# Patient Record
Sex: FEMALE | Race: WHITE | NOT HISPANIC OR LATINO | Employment: UNEMPLOYED | URBAN - METROPOLITAN AREA
[De-identification: names, ages, dates, MRNs, and addresses within clinical notes are randomized per-mention and may not be internally consistent; named-entity substitution may affect disease eponyms.]

---

## 2020-09-11 ENCOUNTER — OFFICE VISIT (OUTPATIENT)
Dept: URGENT CARE | Facility: CLINIC | Age: 25
End: 2020-09-11
Payer: COMMERCIAL

## 2020-09-11 VITALS
SYSTOLIC BLOOD PRESSURE: 119 MMHG | DIASTOLIC BLOOD PRESSURE: 75 MMHG | HEIGHT: 64 IN | HEART RATE: 77 BPM | WEIGHT: 130 LBS | BODY MASS INDEX: 22.2 KG/M2 | OXYGEN SATURATION: 99 % | TEMPERATURE: 98 F

## 2020-09-11 DIAGNOSIS — S80.212A ABRASION OF LEFT KNEE, INITIAL ENCOUNTER: Primary | ICD-10-CM

## 2020-09-11 PROCEDURE — 99202 OFFICE O/P NEW SF 15 MIN: CPT | Mod: S$GLB,,, | Performed by: INTERNAL MEDICINE

## 2020-09-11 PROCEDURE — 99202 PR OFFICE/OUTPT VISIT, NEW, LEVL II, 15-29 MIN: ICD-10-PCS | Mod: S$GLB,,, | Performed by: INTERNAL MEDICINE
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
## 2020-09-11 NOTE — PATIENT INSTRUCTIONS
Wound Care  Taking proper care of your wound will help it heal. Your healthcare provider may show you how to clean and dress the wound. He or she will also explain how to tell if the wound is healing normally. If you are unsure of how to take care of the wound, be sure to clarify what dressing to use and how often you should change the bandages. Here are the basic steps.     A wound that's not healing normally may be dark in color or have white streaks.   Wash your hands  Tips for washing your hands include:  · Use liquid soap and lather for 2 minutes. Scrub between your fingers and under your nails.  · Rinse with warm water, keeping your fingers pointing down.  · Use a paper towel to dry your hands and to turn off the faucet.  Remove the used dressing  Here are suggestions for removing the dressing:  · If dressing changes cause you pain, be sure to take your pain medicine as prescribed by your healthcare provider 30 minutes before dressing changes.  · Set up your supplies.  · Put on disposable gloves if youre dressing a wound for someone else or your wound is infected.  · Loosen the tape by pulling gently toward the wound.  · Gently take off the old dressing. If the dressing is stuck to the wound, moisten it with saline (if available) or clean water.  · If you have a drain or tube in the wound, be careful not to pull on it.  · Remove the dressing 1 layer at a time and put it in a plastic bag.  · Remove your gloves.  Inspect and dress the wound  Check the wound carefully:  · Each time you change the dressing, inspect the wound carefully to be sure its healing normally by making sure your wound appears to be pink and moist, and is free of infection.    · Wash your hands again. Put on a new pair of gloves.  · Clean and dress the wound as directed by your healthcare provider or nurse. Do not put anything in the wound that is not prescribed or directed by your healthcare provider. If you have a drain or tube, be  careful not to pull on it. Make sure to secure the drain or tube as well.  · Put all supplies in a plastic bag. Seal the bag and put it in the trash.  · Be sure to wash your hands again.  Call your healthcare provider  Call your healthcare provider if you see any of the following signs of a problem:  · Bleeding that soaks the dressing  · Pink fluid weeping from the wound  · Increased drainage or drainage that is yellow, yellow-green, or foul-smelling  · Increased swelling or pain, or redness or swelling in the skin around the wound  · A change in the color of the wound, or if streaks develop in a direction away from the wound  · The area between any stitches opens up  · An increase in the size of the wound  · A fever of 100.4°F (38°C) or higher, or as directed by your healthcare provider  · Chills, increased fatigue, or a loss of appetite      Date Last Reviewed: 7/30/2015  © 7005-3038 [employer] [address] [place] [address], [place] [address]. All rights reserved. This information is not intended as a substitute for professional medical care. Always follow your healthcare professional's instructions.

## 2020-09-14 ENCOUNTER — OFFICE VISIT (OUTPATIENT)
Dept: URGENT CARE | Facility: CLINIC | Age: 25
End: 2020-09-14
Payer: COMMERCIAL

## 2020-09-14 VITALS
OXYGEN SATURATION: 99 % | DIASTOLIC BLOOD PRESSURE: 71 MMHG | TEMPERATURE: 98 F | BODY MASS INDEX: 22.2 KG/M2 | WEIGHT: 130 LBS | HEART RATE: 99 BPM | HEIGHT: 64 IN | SYSTOLIC BLOOD PRESSURE: 114 MMHG

## 2020-09-14 DIAGNOSIS — S80.212D ABRASION OF LEFT KNEE, SUBSEQUENT ENCOUNTER: Primary | ICD-10-CM

## 2020-09-14 PROCEDURE — 99499 UNLISTED E&M SERVICE: CPT | Mod: S$GLB,,, | Performed by: INTERNAL MEDICINE

## 2020-09-14 PROCEDURE — 99499 NO LOS: ICD-10-PCS | Mod: S$GLB,,, | Performed by: INTERNAL MEDICINE

## 2020-09-14 NOTE — PROGRESS NOTES
"Subjective:       Patient ID: Lexis Candelario is a 24 y.o. female.    Vitals:  height is 5' 4" (1.626 m) and weight is 59 kg (130 lb). Her temperature is 97.8 °F (36.6 °C). Her blood pressure is 114/71 and her pulse is 99. Her oxygen saturation is 99%.     Chief Complaint: No chief complaint on file.    Patient wants a wound check for the wound on her left knee     Wound Check  She was originally treated 3 to 5 days ago. There has been no drainage from the wound. There is no redness present. There is no swelling present. There is no pain present. She has no difficulty moving the affected extremity or digit.       Constitution: Negative for chills, fatigue and fever.   HENT: Negative for congestion and sore throat.    Neck: Negative for painful lymph nodes.   Cardiovascular: Negative for chest pain and leg swelling.   Eyes: Negative for double vision and blurred vision.   Respiratory: Negative for cough and shortness of breath.    Gastrointestinal: Negative for nausea, vomiting and diarrhea.   Genitourinary: Negative for dysuria, frequency, urgency and history of kidney stones.   Musculoskeletal: Negative for joint pain, joint swelling, muscle cramps and muscle ache.   Skin: Positive for wound. Negative for color change, pale, rash and bruising.   Allergic/Immunologic: Negative for seasonal allergies.   Neurological: Negative for dizziness, history of vertigo, light-headedness, passing out and headaches.   Hematologic/Lymphatic: Negative for swollen lymph nodes.   Psychiatric/Behavioral: Negative for nervous/anxious, sleep disturbance and depression. The patient is not nervous/anxious.        Objective:      Physical Exam    Healthy granulation tissue seen. No surrounding erythema or induration. No discharge.  Well healing wound.   Assessment:       1. Abrasion of left knee, subsequent encounter        Plan:         Abrasion of left knee, subsequent encounter    Wound cleaned and mupirocin applied. Bandaged. Well " healing.

## 2021-02-06 ENCOUNTER — OFFICE VISIT (OUTPATIENT)
Dept: URGENT CARE | Facility: CLINIC | Age: 26
End: 2021-02-06
Payer: COMMERCIAL

## 2021-02-06 VITALS
BODY MASS INDEX: 22.2 KG/M2 | TEMPERATURE: 99 F | SYSTOLIC BLOOD PRESSURE: 111 MMHG | WEIGHT: 130 LBS | OXYGEN SATURATION: 97 % | HEART RATE: 100 BPM | HEIGHT: 64 IN | DIASTOLIC BLOOD PRESSURE: 72 MMHG

## 2021-02-06 DIAGNOSIS — U07.1 COVID-19 VIRUS DETECTED: ICD-10-CM

## 2021-02-06 DIAGNOSIS — Z11.59 SCREENING FOR VIRAL DISEASE: Primary | ICD-10-CM

## 2021-02-06 LAB
CTP QC/QA: YES
SARS-COV-2 RDRP RESP QL NAA+PROBE: POSITIVE

## 2021-02-06 PROCEDURE — U0002 COVID-19 LAB TEST NON-CDC: HCPCS | Mod: QW,S$GLB,, | Performed by: FAMILY MEDICINE

## 2021-02-06 PROCEDURE — 3008F PR BODY MASS INDEX (BMI) DOCUMENTED: ICD-10-PCS | Mod: CPTII,S$GLB,, | Performed by: FAMILY MEDICINE

## 2021-02-06 PROCEDURE — 99212 PR OFFICE/OUTPT VISIT, EST, LEVL II, 10-19 MIN: ICD-10-PCS | Mod: S$GLB,,, | Performed by: FAMILY MEDICINE

## 2021-02-06 PROCEDURE — U0002: ICD-10-PCS | Mod: QW,S$GLB,, | Performed by: FAMILY MEDICINE

## 2021-02-06 PROCEDURE — 99212 OFFICE O/P EST SF 10 MIN: CPT | Mod: S$GLB,,, | Performed by: FAMILY MEDICINE

## 2021-02-06 PROCEDURE — 3008F BODY MASS INDEX DOCD: CPT | Mod: CPTII,S$GLB,, | Performed by: FAMILY MEDICINE

## 2021-02-08 ENCOUNTER — NURSE TRIAGE (OUTPATIENT)
Dept: ADMINISTRATIVE | Facility: CLINIC | Age: 26
End: 2021-02-08

## 2021-03-09 ENCOUNTER — OFFICE VISIT (OUTPATIENT)
Dept: OBSTETRICS AND GYNECOLOGY | Facility: CLINIC | Age: 26
End: 2021-03-09
Payer: COMMERCIAL

## 2021-03-09 ENCOUNTER — LAB VISIT (OUTPATIENT)
Dept: LAB | Facility: HOSPITAL | Age: 26
End: 2021-03-09
Attending: OBSTETRICS & GYNECOLOGY
Payer: COMMERCIAL

## 2021-03-09 VITALS
WEIGHT: 141.75 LBS | HEIGHT: 64 IN | BODY MASS INDEX: 24.2 KG/M2 | DIASTOLIC BLOOD PRESSURE: 62 MMHG | SYSTOLIC BLOOD PRESSURE: 120 MMHG

## 2021-03-09 DIAGNOSIS — A53.0 POSITIVE SEROLOGY FOR SYPHILIS: ICD-10-CM

## 2021-03-09 DIAGNOSIS — N92.0 MENORRHAGIA WITH REGULAR CYCLE: ICD-10-CM

## 2021-03-09 DIAGNOSIS — Z01.419 VISIT FOR GYNECOLOGIC EXAMINATION: Primary | ICD-10-CM

## 2021-03-09 LAB
BASOPHILS # BLD AUTO: 0.09 K/UL (ref 0–0.2)
BASOPHILS NFR BLD: 1.2 % (ref 0–1.9)
DIFFERENTIAL METHOD: ABNORMAL
EOSINOPHIL # BLD AUTO: 0.1 K/UL (ref 0–0.5)
EOSINOPHIL NFR BLD: 1.3 % (ref 0–8)
ERYTHROCYTE [DISTWIDTH] IN BLOOD BY AUTOMATED COUNT: 11.9 % (ref 11.5–14.5)
HCT VFR BLD AUTO: 39.6 % (ref 37–48.5)
HGB BLD-MCNC: 13.3 G/DL (ref 12–16)
IMM GRANULOCYTES # BLD AUTO: 0.14 K/UL (ref 0–0.04)
IMM GRANULOCYTES NFR BLD AUTO: 1.9 % (ref 0–0.5)
LYMPHOCYTES # BLD AUTO: 2.3 K/UL (ref 1–4.8)
LYMPHOCYTES NFR BLD: 30.9 % (ref 18–48)
MCH RBC QN AUTO: 29.8 PG (ref 27–31)
MCHC RBC AUTO-ENTMCNC: 33.6 G/DL (ref 32–36)
MCV RBC AUTO: 89 FL (ref 82–98)
MONOCYTES # BLD AUTO: 0.7 K/UL (ref 0.3–1)
MONOCYTES NFR BLD: 9.4 % (ref 4–15)
NEUTROPHILS # BLD AUTO: 4.1 K/UL (ref 1.8–7.7)
NEUTROPHILS NFR BLD: 55.3 % (ref 38–73)
NRBC BLD-RTO: 0 /100 WBC
PLATELET # BLD AUTO: 261 K/UL (ref 150–350)
PMV BLD AUTO: 8.7 FL (ref 9.2–12.9)
RBC # BLD AUTO: 4.46 M/UL (ref 4–5.4)
TSH SERPL DL<=0.005 MIU/L-ACNC: 1.94 UIU/ML (ref 0.4–4)
WBC # BLD AUTO: 7.47 K/UL (ref 3.9–12.7)

## 2021-03-09 PROCEDURE — 84443 ASSAY THYROID STIM HORMONE: CPT | Performed by: OBSTETRICS & GYNECOLOGY

## 2021-03-09 PROCEDURE — 99999 PR PBB SHADOW E&M-EST. PATIENT-LVL II: ICD-10-PCS | Mod: PBBFAC,,, | Performed by: OBSTETRICS & GYNECOLOGY

## 2021-03-09 PROCEDURE — 99385 PREV VISIT NEW AGE 18-39: CPT | Mod: S$GLB,,, | Performed by: OBSTETRICS & GYNECOLOGY

## 2021-03-09 PROCEDURE — 3008F PR BODY MASS INDEX (BMI) DOCUMENTED: ICD-10-PCS | Mod: CPTII,S$GLB,, | Performed by: OBSTETRICS & GYNECOLOGY

## 2021-03-09 PROCEDURE — 99385 PR PREVENTIVE VISIT,NEW,18-39: ICD-10-PCS | Mod: S$GLB,,, | Performed by: OBSTETRICS & GYNECOLOGY

## 2021-03-09 PROCEDURE — 36415 COLL VENOUS BLD VENIPUNCTURE: CPT | Performed by: OBSTETRICS & GYNECOLOGY

## 2021-03-09 PROCEDURE — 86592 SYPHILIS TEST NON-TREP QUAL: CPT | Performed by: OBSTETRICS & GYNECOLOGY

## 2021-03-09 PROCEDURE — 85025 COMPLETE CBC W/AUTO DIFF WBC: CPT | Performed by: OBSTETRICS & GYNECOLOGY

## 2021-03-09 PROCEDURE — 1126F AMNT PAIN NOTED NONE PRSNT: CPT | Mod: S$GLB,,, | Performed by: OBSTETRICS & GYNECOLOGY

## 2021-03-09 PROCEDURE — 1126F PR PAIN SEVERITY QUANTIFIED, NO PAIN PRESENT: ICD-10-PCS | Mod: S$GLB,,, | Performed by: OBSTETRICS & GYNECOLOGY

## 2021-03-09 PROCEDURE — 86780 TREPONEMA PALLIDUM: CPT | Performed by: OBSTETRICS & GYNECOLOGY

## 2021-03-09 PROCEDURE — 99999 PR PBB SHADOW E&M-EST. PATIENT-LVL II: CPT | Mod: PBBFAC,,, | Performed by: OBSTETRICS & GYNECOLOGY

## 2021-03-09 PROCEDURE — 3008F BODY MASS INDEX DOCD: CPT | Mod: CPTII,S$GLB,, | Performed by: OBSTETRICS & GYNECOLOGY

## 2021-03-10 LAB — RPR SER QL: NORMAL

## 2021-03-14 ENCOUNTER — PATIENT MESSAGE (OUTPATIENT)
Dept: OBSTETRICS AND GYNECOLOGY | Facility: CLINIC | Age: 26
End: 2021-03-14

## 2021-03-15 LAB — T PALLIDUM AB SER QL IF: NORMAL

## 2021-05-28 ENCOUNTER — OFFICE VISIT (OUTPATIENT)
Dept: URGENT CARE | Facility: CLINIC | Age: 26
End: 2021-05-28
Payer: COMMERCIAL

## 2021-05-28 VITALS
SYSTOLIC BLOOD PRESSURE: 119 MMHG | RESPIRATION RATE: 20 BRPM | DIASTOLIC BLOOD PRESSURE: 83 MMHG | BODY MASS INDEX: 22.2 KG/M2 | OXYGEN SATURATION: 99 % | HEART RATE: 67 BPM | HEIGHT: 64 IN | WEIGHT: 130 LBS | TEMPERATURE: 98 F

## 2021-05-28 DIAGNOSIS — M25.572 ACUTE LEFT ANKLE PAIN: ICD-10-CM

## 2021-05-28 DIAGNOSIS — S93.602A FOOT SPRAIN, LEFT, INITIAL ENCOUNTER: Primary | ICD-10-CM

## 2021-05-28 DIAGNOSIS — S93.402A SPRAIN OF LEFT ANKLE, UNSPECIFIED LIGAMENT, INITIAL ENCOUNTER: ICD-10-CM

## 2021-05-28 PROCEDURE — 99214 OFFICE O/P EST MOD 30 MIN: CPT | Mod: S$GLB,,, | Performed by: NURSE PRACTITIONER

## 2021-05-28 PROCEDURE — 73610 XR ANKLE COMPLETE 3 VIEW LEFT: ICD-10-PCS | Mod: LT,S$GLB,, | Performed by: RADIOLOGY

## 2021-05-28 PROCEDURE — 73630 XR FOOT COMPLETE 3 VIEW LEFT: ICD-10-PCS | Mod: LT,S$GLB,, | Performed by: RADIOLOGY

## 2021-05-28 PROCEDURE — 73610 X-RAY EXAM OF ANKLE: CPT | Mod: LT,S$GLB,, | Performed by: RADIOLOGY

## 2021-05-28 PROCEDURE — 73630 X-RAY EXAM OF FOOT: CPT | Mod: LT,S$GLB,, | Performed by: RADIOLOGY

## 2021-05-28 PROCEDURE — 99214 PR OFFICE/OUTPT VISIT, EST, LEVL IV, 30-39 MIN: ICD-10-PCS | Mod: S$GLB,,, | Performed by: NURSE PRACTITIONER

## 2021-05-28 PROCEDURE — 3008F PR BODY MASS INDEX (BMI) DOCUMENTED: ICD-10-PCS | Mod: CPTII,S$GLB,, | Performed by: NURSE PRACTITIONER

## 2021-05-28 PROCEDURE — 3008F BODY MASS INDEX DOCD: CPT | Mod: CPTII,S$GLB,, | Performed by: NURSE PRACTITIONER

## 2021-05-28 RX ORDER — IBUPROFEN 600 MG/1
600 TABLET ORAL EVERY 6 HOURS PRN
Qty: 30 TABLET | Refills: 0 | Status: SHIPPED | OUTPATIENT
Start: 2021-05-28 | End: 2022-03-22

## 2021-09-04 ENCOUNTER — OFFICE VISIT (OUTPATIENT)
Dept: URGENT CARE | Facility: CLINIC | Age: 26
End: 2021-09-04
Payer: COMMERCIAL

## 2021-09-04 VITALS
TEMPERATURE: 98.4 F | OXYGEN SATURATION: 98 % | HEART RATE: 73 BPM | WEIGHT: 135 LBS | HEIGHT: 64 IN | BODY MASS INDEX: 23.05 KG/M2

## 2021-09-04 DIAGNOSIS — Z20.822 ENCOUNTER FOR SCREENING LABORATORY TESTING FOR COVID-19 VIRUS: Primary | ICD-10-CM

## 2021-09-04 DIAGNOSIS — J06.9 ACUTE URI: ICD-10-CM

## 2021-09-04 LAB — S PYO AG THROAT QL: NEGATIVE

## 2021-09-04 PROCEDURE — 87880 STREP A ASSAY W/OPTIC: CPT | Performed by: NURSE PRACTITIONER

## 2021-09-04 PROCEDURE — 87070 CULTURE OTHR SPECIMN AEROBIC: CPT | Performed by: NURSE PRACTITIONER

## 2021-09-04 PROCEDURE — 87635 SARS-COV-2 COVID-19 AMP PRB: CPT | Performed by: NURSE PRACTITIONER

## 2021-09-04 PROCEDURE — 99213 OFFICE O/P EST LOW 20 MIN: CPT | Performed by: NURSE PRACTITIONER

## 2021-09-04 NOTE — PROGRESS NOTES
[facility] [employer] [facility]        NAME: Nidia Moss is a 23 y o  female  : 1995    MRN: [id number]  DATE: 2021  TIME: 1:35 PM    Assessment and Plan   Encounter for screening laboratory testing for COVID-19 virus [Z20 [id number]]  1  Encounter for screening laboratory testing for COVID-19 virus  Novel Coronavirus (Covid-19),PCR [facility] Collection   2  Acute URI  POCT rapid strepA    Throat culture         Patient Instructions       Follow up with PCP in 3-5 days  Proceed to  ER if symptoms worsen  Chief Complaint     Chief Complaint   Patient presents with    Sore Throat     Sore throat, dry cough and nasal congestion x 4 days  Taking tylenol  No fever  History of Present Illness       Patient is a 23year old female presenting with 4 days of sore throat, congestion, clogged ears, chills, cough, PND, and rhinorrhea  Denies fever, N/V/D  She is using Sudafed, zyrtec, and tylenol  She is vaccinated for Covid  Denies known sick contacts  Review of Systems   Review of Systems   Constitutional: Positive for chills  Negative for activity change and fever  HENT: Positive for congestion, ear pain, postnasal drip, sinus pressure and sore throat  Respiratory: Positive for cough  Negative for shortness of breath  Current Medications     No current outpatient medications on file  Current Allergies     Allergies as of 2021 - Reviewed 2021   Allergen Reaction Noted    Other Hives 2021            The following portions of the patient's history were reviewed and updated as appropriate: allergies, current medications, past family history, past medical history, past social history, past surgical history and problem list      Past Medical History:   Diagnosis Date    COVID-19 2021       Past Surgical History:   Procedure Laterality Date    WISDOM TOOTH EXTRACTION         No family history on file  Medications have been verified          Objective Pulse 73   Temp 98 4 °F (36 9 °C)   Ht 5' 4" (1 626 m)   Wt 61 2 kg (135 lb)   SpO2 98%   BMI 23 17 kg/m²      Rapid strep: negative  Physical Exam     Physical Exam  Vitals reviewed  Constitutional:       General: She is awake  She is not in acute distress  Appearance: She is normal weight  HENT:      Head: Normocephalic  Right Ear: Hearing, tympanic membrane, ear canal and external ear normal       Left Ear: Hearing, tympanic membrane, ear canal and external ear normal       Nose: Congestion present  Mouth/Throat:      Lips: Pink  Cardiovascular:      Rate and Rhythm: Normal rate and regular rhythm  Heart sounds: Normal heart sounds, S1 normal and S2 normal    Pulmonary:      Effort: Pulmonary effort is normal       Breath sounds: Normal breath sounds  No decreased breath sounds, wheezing, rhonchi or rales  Skin:     General: Skin is warm and moist    Neurological:      General: No focal deficit present  Mental Status: She is alert, oriented to person, place, and time and easily aroused  Psychiatric:         Behavior: Behavior is cooperative

## 2021-09-06 LAB — SARS-COV-2 RNA RESP QL NAA+PROBE: NEGATIVE

## 2021-09-07 LAB — BACTERIA THROAT CULT: NORMAL

## 2021-12-03 ENCOUNTER — OFFICE VISIT (OUTPATIENT)
Dept: URGENT CARE | Facility: CLINIC | Age: 26
End: 2021-12-03
Payer: COMMERCIAL

## 2021-12-03 VITALS
TEMPERATURE: 99 F | RESPIRATION RATE: 18 BRPM | OXYGEN SATURATION: 98 % | WEIGHT: 135 LBS | HEIGHT: 64 IN | SYSTOLIC BLOOD PRESSURE: 121 MMHG | HEART RATE: 71 BPM | BODY MASS INDEX: 23.05 KG/M2 | DIASTOLIC BLOOD PRESSURE: 80 MMHG

## 2021-12-03 DIAGNOSIS — Z11.59 ENCOUNTER FOR SCREENING FOR OTHER VIRAL DISEASES: Primary | ICD-10-CM

## 2021-12-03 LAB
CTP QC/QA: YES
CTP QC/QA: YES
POC MOLECULAR INFLUENZA A AGN: NEGATIVE
POC MOLECULAR INFLUENZA B AGN: NEGATIVE
SARS-COV-2 RDRP RESP QL NAA+PROBE: NEGATIVE

## 2021-12-03 PROCEDURE — 87502 INFLUENZA DNA AMP PROBE: CPT | Mod: QW,S$GLB,, | Performed by: NURSE PRACTITIONER

## 2021-12-03 PROCEDURE — 99213 OFFICE O/P EST LOW 20 MIN: CPT | Mod: S$GLB,,, | Performed by: NURSE PRACTITIONER

## 2021-12-03 PROCEDURE — U0002: ICD-10-PCS | Mod: QW,S$GLB,, | Performed by: NURSE PRACTITIONER

## 2021-12-03 PROCEDURE — U0002 COVID-19 LAB TEST NON-CDC: HCPCS | Mod: QW,S$GLB,, | Performed by: NURSE PRACTITIONER

## 2021-12-03 PROCEDURE — 99213 PR OFFICE/OUTPT VISIT, EST, LEVL III, 20-29 MIN: ICD-10-PCS | Mod: S$GLB,,, | Performed by: NURSE PRACTITIONER

## 2021-12-03 PROCEDURE — 87502 POCT INFLUENZA A/B MOLECULAR: ICD-10-PCS | Mod: QW,S$GLB,, | Performed by: NURSE PRACTITIONER

## 2022-03-22 ENCOUNTER — LAB VISIT (OUTPATIENT)
Dept: LAB | Facility: HOSPITAL | Age: 27
End: 2022-03-22
Payer: COMMERCIAL

## 2022-03-22 ENCOUNTER — OFFICE VISIT (OUTPATIENT)
Dept: INTERNAL MEDICINE | Facility: CLINIC | Age: 27
End: 2022-03-22
Payer: COMMERCIAL

## 2022-03-22 ENCOUNTER — TELEPHONE (OUTPATIENT)
Dept: INTERNAL MEDICINE | Facility: CLINIC | Age: 27
End: 2022-03-22

## 2022-03-22 VITALS
HEART RATE: 80 BPM | WEIGHT: 137.38 LBS | DIASTOLIC BLOOD PRESSURE: 68 MMHG | BODY MASS INDEX: 23.45 KG/M2 | SYSTOLIC BLOOD PRESSURE: 118 MMHG | HEIGHT: 64 IN

## 2022-03-22 DIAGNOSIS — Z76.89 ESTABLISHING CARE WITH NEW DOCTOR, ENCOUNTER FOR: ICD-10-CM

## 2022-03-22 DIAGNOSIS — Z76.89 ESTABLISHING CARE WITH NEW DOCTOR, ENCOUNTER FOR: Primary | ICD-10-CM

## 2022-03-22 LAB
25(OH)D3+25(OH)D2 SERPL-MCNC: 24 NG/ML (ref 30–96)
ALBUMIN SERPL BCP-MCNC: 4.3 G/DL (ref 3.5–5.2)
ALP SERPL-CCNC: 85 U/L (ref 55–135)
ALT SERPL W/O P-5'-P-CCNC: 13 U/L (ref 10–44)
ANION GAP SERPL CALC-SCNC: 11 MMOL/L (ref 8–16)
AST SERPL-CCNC: 20 U/L (ref 10–40)
BASOPHILS # BLD AUTO: 0.09 K/UL (ref 0–0.2)
BASOPHILS NFR BLD: 1 % (ref 0–1.9)
BILIRUB SERPL-MCNC: 0.5 MG/DL (ref 0.1–1)
BUN SERPL-MCNC: 10 MG/DL (ref 6–20)
CALCIUM SERPL-MCNC: 9.8 MG/DL (ref 8.7–10.5)
CHLORIDE SERPL-SCNC: 106 MMOL/L (ref 95–110)
CO2 SERPL-SCNC: 23 MMOL/L (ref 23–29)
CREAT SERPL-MCNC: 0.8 MG/DL (ref 0.5–1.4)
DIFFERENTIAL METHOD: ABNORMAL
EOSINOPHIL # BLD AUTO: 0.5 K/UL (ref 0–0.5)
EOSINOPHIL NFR BLD: 5.8 % (ref 0–8)
ERYTHROCYTE [DISTWIDTH] IN BLOOD BY AUTOMATED COUNT: 11.8 % (ref 11.5–14.5)
EST. GFR  (AFRICAN AMERICAN): >60 ML/MIN/1.73 M^2
EST. GFR  (NON AFRICAN AMERICAN): >60 ML/MIN/1.73 M^2
GLUCOSE SERPL-MCNC: 110 MG/DL (ref 70–110)
HCT VFR BLD AUTO: 41.6 % (ref 37–48.5)
HGB BLD-MCNC: 13.7 G/DL (ref 12–16)
IMM GRANULOCYTES # BLD AUTO: 0.06 K/UL (ref 0–0.04)
IMM GRANULOCYTES NFR BLD AUTO: 0.7 % (ref 0–0.5)
LYMPHOCYTES # BLD AUTO: 2.5 K/UL (ref 1–4.8)
LYMPHOCYTES NFR BLD: 27 % (ref 18–48)
MCH RBC QN AUTO: 31 PG (ref 27–31)
MCHC RBC AUTO-ENTMCNC: 32.9 G/DL (ref 32–36)
MCV RBC AUTO: 94 FL (ref 82–98)
MONOCYTES # BLD AUTO: 0.7 K/UL (ref 0.3–1)
MONOCYTES NFR BLD: 7.5 % (ref 4–15)
NEUTROPHILS # BLD AUTO: 5.3 K/UL (ref 1.8–7.7)
NEUTROPHILS NFR BLD: 58 % (ref 38–73)
NRBC BLD-RTO: 0 /100 WBC
PLATELET # BLD AUTO: 285 K/UL (ref 150–450)
PMV BLD AUTO: 10.2 FL (ref 9.2–12.9)
POTASSIUM SERPL-SCNC: 3.6 MMOL/L (ref 3.5–5.1)
PROT SERPL-MCNC: 7.4 G/DL (ref 6–8.4)
RBC # BLD AUTO: 4.42 M/UL (ref 4–5.4)
SODIUM SERPL-SCNC: 140 MMOL/L (ref 136–145)
TSH SERPL DL<=0.005 MIU/L-ACNC: 1.59 UIU/ML (ref 0.4–4)
WBC # BLD AUTO: 9.1 K/UL (ref 3.9–12.7)

## 2022-03-22 PROCEDURE — 36415 COLL VENOUS BLD VENIPUNCTURE: CPT | Performed by: STUDENT IN AN ORGANIZED HEALTH CARE EDUCATION/TRAINING PROGRAM

## 2022-03-22 PROCEDURE — 85025 COMPLETE CBC W/AUTO DIFF WBC: CPT | Performed by: STUDENT IN AN ORGANIZED HEALTH CARE EDUCATION/TRAINING PROGRAM

## 2022-03-22 PROCEDURE — 80053 COMPREHEN METABOLIC PANEL: CPT | Performed by: STUDENT IN AN ORGANIZED HEALTH CARE EDUCATION/TRAINING PROGRAM

## 2022-03-22 PROCEDURE — 99999 PR PBB SHADOW E&M-EST. PATIENT-LVL III: ICD-10-PCS | Mod: PBBFAC,,, | Performed by: STUDENT IN AN ORGANIZED HEALTH CARE EDUCATION/TRAINING PROGRAM

## 2022-03-22 PROCEDURE — 84443 ASSAY THYROID STIM HORMONE: CPT | Performed by: STUDENT IN AN ORGANIZED HEALTH CARE EDUCATION/TRAINING PROGRAM

## 2022-03-22 PROCEDURE — 99395 PR PREVENTIVE VISIT,EST,18-39: ICD-10-PCS | Mod: S$GLB,,, | Performed by: STUDENT IN AN ORGANIZED HEALTH CARE EDUCATION/TRAINING PROGRAM

## 2022-03-22 PROCEDURE — 87389 HIV-1 AG W/HIV-1&-2 AB AG IA: CPT | Performed by: STUDENT IN AN ORGANIZED HEALTH CARE EDUCATION/TRAINING PROGRAM

## 2022-03-22 PROCEDURE — 86803 HEPATITIS C AB TEST: CPT | Performed by: STUDENT IN AN ORGANIZED HEALTH CARE EDUCATION/TRAINING PROGRAM

## 2022-03-22 PROCEDURE — 99395 PREV VISIT EST AGE 18-39: CPT | Mod: S$GLB,,, | Performed by: STUDENT IN AN ORGANIZED HEALTH CARE EDUCATION/TRAINING PROGRAM

## 2022-03-22 PROCEDURE — 99999 PR PBB SHADOW E&M-EST. PATIENT-LVL III: CPT | Mod: PBBFAC,,, | Performed by: STUDENT IN AN ORGANIZED HEALTH CARE EDUCATION/TRAINING PROGRAM

## 2022-03-22 PROCEDURE — 82306 VITAMIN D 25 HYDROXY: CPT | Performed by: STUDENT IN AN ORGANIZED HEALTH CARE EDUCATION/TRAINING PROGRAM

## 2022-03-22 NOTE — PROGRESS NOTES
Internal Medicine Clinic Note  2022    Subjective   Patient Name: Lexis Candelario  : 1995    Chief Complaint: Establish care    History of Present Illness:  Ms. Lexis Candelario is a 26 y.o. female with remote history of sinusitis who presents to [facility]. Here seeking a general wellness visit. One episode in  of dizziness, since resolved. Felt similar to sinusitis in past, but denies other URI symptoms. EtOH socially. Regular physical activity with running, yoga, pilates. Appt next week with ob-gyn. Denies fever, chills, syncope, CP, palpitations, SOB, abdominal pain, N/V/D/C, edema.    Review of Systems   Constitutional: Negative for chills and fever.   HENT: Negative for sore throat.    Respiratory: Negative for shortness of breath.    Cardiovascular: Negative for chest pain, palpitations and leg swelling.   Gastrointestinal: Negative for abdominal pain, constipation, diarrhea, nausea and vomiting.   Genitourinary: Negative for dysuria.   Musculoskeletal: Negative for falls.   Neurological: Negative for dizziness and loss of consciousness.   Psychiatric/Behavioral: Negative for substance abuse.       PAST HISTORY:     Past Medical History:   Diagnosis Date    Sinusitis        Past Surgical History:   Procedure Laterality Date    WISDOM TOOTH EXTRACTION         Family History   Problem Relation Age of Onset    No Known Problems Mother     No Known Problems Father     No Known Problems Sister     No Known Problems Brother        Social History     Socioeconomic History    Marital status: Single   Tobacco Use    Smoking status: Never Smoker    Smokeless tobacco: Never Used   Substance and Sexual Activity    Alcohol use: Yes     Comment: Socially     Drug use: Not Currently    Sexual activity: Not Currently       MEDICATIONS & ALLERGIES:       Current Outpatient Medications:     ibuprofen (ADVIL,MOTRIN) 600 MG tablet, Take 1 tablet (600 mg total) by mouth every 6 (six) hours as  "needed. (Patient not taking: Reported on 12/3/2021), Disp: 30 tablet, Rfl: 0    Review of patient's allergies indicates:   Allergen Reactions    Benadryl allergy decongestant Hives       OBJECTIVE:     Vital Signs:  Vitals:    03/22/22 1602   BP: 118/68   Pulse: 80   Weight: 62.3 kg (137 lb 5.6 oz)   Height: 5' 4" (1.626 m)       Body mass index is 23.58 kg/m².     Physical Exam  Constitutional:       Appearance: Normal appearance.   HENT:      Head: Normocephalic and atraumatic.      Mouth/Throat:      Mouth: Mucous membranes are moist.      Pharynx: Oropharynx is clear.   Eyes:      Extraocular Movements: Extraocular movements intact.      Pupils: Pupils are equal, round, and reactive to light.   Cardiovascular:      Rate and Rhythm: Normal rate and regular rhythm.      Pulses: Normal pulses.      Heart sounds: Normal heart sounds.   Pulmonary:      Effort: Pulmonary effort is normal. No respiratory distress.      Breath sounds: Normal breath sounds.   Abdominal:      General: Abdomen is flat. Bowel sounds are normal.      Palpations: Abdomen is soft.   Musculoskeletal:         General: No swelling.      Cervical back: Neck supple.   Skin:     General: Skin is warm and dry.      Capillary Refill: Capillary refill takes less than 2 seconds.   Neurological:      General: No focal deficit present.      Mental Status: She is alert and oriented to person, place, and time. Mental status is at baseline.   Psychiatric:         Mood and Affect: Mood normal.         Behavior: Behavior normal.         Thought Content: Thought content normal.         Judgment: Judgment normal.         Laboratory  Lab Results   Component Value Date    WBC 7.47 03/09/2021    HGB 13.3 03/09/2021    HCT 39.6 03/09/2021    MCV 89 03/09/2021     03/09/2021     BMP  No results found for: NA, K, CL, CO2, BUN, CREATININE, CALCIUM, ANIONGAP, ESTGFRAFRICA, EGFRNONAA  No results found for: INR, PROTIME  No results found for: HGBA1C  No results " for input(s): POCTGLUCOSE in the last 72 hours.    Health Maintenance Due   Topic Date Due    Hepatitis C Screening  Never done    HIV Screening  Never done    TETANUS VACCINE  Never done    Pap Smear  Never done    Influenza Vaccine (1) Never done       ASSESSMENT & PLAN:   Lexis was seen today in clinic to establish care    Establishing care with new doctor, encounter for  -- No significant medical history. No medications  -- Feeling well  -- Normal exam and vital signs  -- Comprehensive Metabolic Panel; Future; Expected date: 03/22/2022  -- CBC Auto Differential; Future; Expected date: 03/22/2022  -- TSH; Future; Expected date: 03/22/2022  -- Hepatitis C Antibody; Future; Expected date: 03/22/2022  -- HIV 1/2 Ag/Ab (4th Gen); Future; Expected date: 03/22/2022  -- Vitamin D; Future; Expected date: 03/22/2022      Preventative Health: ob-gyn next week    RTC in 1 year, or sooner as needed.    Discussed with Dr. Milian  - staff attestation to follow    Nazanin Terry DO  Internal Medicine, PGY-2  [facility]  [facility] [place] [address]  [phone]    Preceptor note:    Patient's history and physical discussed, please refer to resident physician's note for specific details.  I agree with resident's assessment and plan.      Luis Milian  Staff Physician  [facility]

## 2022-03-22 NOTE — TELEPHONE ENCOUNTER
----- Message from Ayanna Novoa sent at 3/22/2022  2:26 PM CDT -----  Contact: [phone]  Pt has an appt today at 3:30 and she is a little concerned with the weather and is asking if she can come in a little early please advise and give return call

## 2022-03-23 LAB
HCV AB SERPL QL IA: NEGATIVE
HIV 1+2 AB+HIV1 P24 AG SERPL QL IA: NEGATIVE

## 2022-03-29 ENCOUNTER — PATIENT MESSAGE (OUTPATIENT)
Dept: CRITICAL CARE MEDICINE | Facility: HOSPITAL | Age: 27
End: 2022-03-29
Payer: COMMERCIAL

## 2022-03-31 ENCOUNTER — OFFICE VISIT (OUTPATIENT)
Dept: OBSTETRICS AND GYNECOLOGY | Facility: CLINIC | Age: 27
End: 2022-03-31
Payer: COMMERCIAL

## 2022-03-31 VITALS
HEIGHT: 64 IN | SYSTOLIC BLOOD PRESSURE: 108 MMHG | WEIGHT: 136.44 LBS | DIASTOLIC BLOOD PRESSURE: 70 MMHG | BODY MASS INDEX: 23.29 KG/M2

## 2022-03-31 DIAGNOSIS — Z12.39 BREAST CANCER SCREENING OTHER THAN MAMMOGRAM: ICD-10-CM

## 2022-03-31 DIAGNOSIS — Z30.09 ENCOUNTER FOR OTHER GENERAL COUNSELING OR ADVICE ON CONTRACEPTION: ICD-10-CM

## 2022-03-31 DIAGNOSIS — Z01.419 ENCOUNTER FOR ANNUAL ROUTINE GYNECOLOGICAL EXAMINATION: Primary | ICD-10-CM

## 2022-03-31 DIAGNOSIS — Z23 NEED FOR HPV VACCINATION: ICD-10-CM

## 2022-03-31 PROCEDURE — 3078F DIAST BP <80 MM HG: CPT | Mod: CPTII,S$GLB,, | Performed by: OBSTETRICS & GYNECOLOGY

## 2022-03-31 PROCEDURE — 3078F PR MOST RECENT DIASTOLIC BLOOD PRESSURE < 80 MM HG: ICD-10-PCS | Mod: CPTII,S$GLB,, | Performed by: OBSTETRICS & GYNECOLOGY

## 2022-03-31 PROCEDURE — 99999 PR PBB SHADOW E&M-EST. PATIENT-LVL III: ICD-10-PCS | Mod: PBBFAC,,, | Performed by: OBSTETRICS & GYNECOLOGY

## 2022-03-31 PROCEDURE — 3074F PR MOST RECENT SYSTOLIC BLOOD PRESSURE < 130 MM HG: ICD-10-PCS | Mod: CPTII,S$GLB,, | Performed by: OBSTETRICS & GYNECOLOGY

## 2022-03-31 PROCEDURE — 3008F PR BODY MASS INDEX (BMI) DOCUMENTED: ICD-10-PCS | Mod: CPTII,S$GLB,, | Performed by: OBSTETRICS & GYNECOLOGY

## 2022-03-31 PROCEDURE — 3008F BODY MASS INDEX DOCD: CPT | Mod: CPTII,S$GLB,, | Performed by: OBSTETRICS & GYNECOLOGY

## 2022-03-31 PROCEDURE — 1159F MED LIST DOCD IN RCRD: CPT | Mod: CPTII,S$GLB,, | Performed by: OBSTETRICS & GYNECOLOGY

## 2022-03-31 PROCEDURE — 3074F SYST BP LT 130 MM HG: CPT | Mod: CPTII,S$GLB,, | Performed by: OBSTETRICS & GYNECOLOGY

## 2022-03-31 PROCEDURE — 99999 PR PBB SHADOW E&M-EST. PATIENT-LVL III: CPT | Mod: PBBFAC,,, | Performed by: OBSTETRICS & GYNECOLOGY

## 2022-03-31 PROCEDURE — 99395 PR PREVENTIVE VISIT,EST,18-39: ICD-10-PCS | Mod: S$GLB,,, | Performed by: OBSTETRICS & GYNECOLOGY

## 2022-03-31 PROCEDURE — 1159F PR MEDICATION LIST DOCUMENTED IN MEDICAL RECORD: ICD-10-PCS | Mod: CPTII,S$GLB,, | Performed by: OBSTETRICS & GYNECOLOGY

## 2022-03-31 PROCEDURE — 99395 PREV VISIT EST AGE 18-39: CPT | Mod: S$GLB,,, | Performed by: OBSTETRICS & GYNECOLOGY

## 2022-03-31 PROCEDURE — 88175 CYTOPATH C/V AUTO FLUID REDO: CPT | Performed by: OBSTETRICS & GYNECOLOGY

## 2022-03-31 RX ORDER — NORETHINDRONE ACETATE AND ETHINYL ESTRADIOL 1MG-20(21)
1 KIT ORAL DAILY
Qty: 84 TABLET | Refills: 3 | Status: SHIPPED | OUTPATIENT
Start: 2022-03-31 | End: 2023-03-31

## 2022-03-31 NOTE — PATIENT INSTRUCTIONS
1. When to initiate pills: Any day you would like during your next period. Starting your pills during your period helps decrease irregular bleeding in the first few months by starting you off more in sync with your body's natural hormone levels. You do not have to start on the first day of you period. If you start your period on a Friday but want to wait until Sunday to start taking the medication, that's fine.  2. The need for regular compliance to ensure adequate contraceptive effect: You have to take your pills every single day at around the same time. If you do this, the pill is 95% effective at preventing pregnancy. If you are more irregular when taking your pill that efficacy starts to decrease.  3. What to do in event of a missed pill: Take it as soon as you remember. If this means taking two pills at once then go ahead and take two pills at once. If you are late taking just one pill in a pack you are likely still protected against pregnancy; however, if you are late taking more than one pill in a pack you should consider yourself unprotected and use back up contraception (condoms), until you've completed this pill pack and started you next one.  4. Potential minor side effects such as breakthrough spotting, nausea, breast tenderness, acne, headaches are common and usually go away within a week or two of starting a new medication. If they persist for more than 2 weeks you let me know and we will switch you to a type that will cause fewer side effects.   5. Potential though less likely major side effects such as heart attack, stroke, and deep vein thrombosis are possible. These risks are very low, but they do exist. If you have any concerns or develop any troubling symptoms please tell us immediately.    6. Your birth control pills will be working as contraception after you've been taking them for 2 straight weeks. Prior to that you need to be using condoms.   7. Birth control pills cannot protect you against  diseases such as HIV, herpes, and others, so I recommend condom use.

## 2022-03-31 NOTE — PROGRESS NOTES
Chief Complaint: Well Woman Exam     HPI:      Lexis Candelario is a 26 y.o.  who presents today for well woman exam.  LMP: Patient's last menstrual period was 2022 (exact date).  No issues, problems, or complaints. Specifically, patient denies abnormal vaginal bleeding, discharge, pelvic pain, urinary problems, or changes in appetite. Ms. Candelario has never been sexually active. She is currently using abstinence for contraception but is interested in starting reliable contraception. She declines STD screening today.    Previous Pap:  no abnormalities per patient (3/31/2022)  Previous Mammogram:  No results found for this or any previous visit.  Most Recent Dexa: n/a  Colonoscopy: n/a    COVID: Completed  Flu: Has not had  Gardasil:has never had     There is no problem list on file for this patient.      Past Medical History:   Diagnosis Date    Sinusitis        Past Surgical History:   Procedure Laterality Date    WISDOM TOOTH EXTRACTION         OB History        0    Para   0    Term   0       0    AB   0    Living   0       SAB   0    IAB   0    Ectopic   0    Multiple   0    Live Births   0           Obstetric Comments   14/R/5  No abnormal pap  No STDs (Hx false + RPR)             ROS:     Review of Systems   Constitutional: Negative for activity change, appetite change, chills, fatigue and fever.   Respiratory: Negative for shortness of breath.    Cardiovascular: Negative for chest pain.   Gastrointestinal: Negative for abdominal pain, constipation, diarrhea, nausea and vomiting.   Endocrine: Negative for cold intolerance and heat intolerance.   Genitourinary: Negative for dysuria, flank pain, frequency, hematuria, menstrual irregularity, pelvic pain, urgency and vaginal discharge.   Musculoskeletal: Negative for back pain.   Integumentary:  Negative for rash, breast mass, breast discharge and breast tenderness.   Neurological: Negative for headaches.   Psychiatric/Behavioral:  "Negative for dysphoric mood. The patient is not nervous/anxious.    Breast: Negative for mass and tenderness      Physical Exam:      PHYSICAL EXAM:  /70   Ht 5' 4" (1.626 m)   Wt 61.9 kg (136 lb 7.4 oz)   LMP 03/24/2022 (Exact Date) Comment: not on contraceptive.  BMI 23.42 kg/m²   Body mass index is 23.42 kg/m².     APPEARANCE: Well nourished, well developed, in no acute distress.  PSYCH: Appropriate mood and affect.  SKIN: No acne or hirsutism  NECK: Neck symmetric without masses or thyromegaly  NODES: No inguinal, axillary, or supraclavicular lymph node enlargement  ABDOMEN: Soft.  No tenderness or masses.    CARDIOVASCULAR: No edema of peripheral extremities  BREASTS: Symmetrical, no skin changes or visible lesions.  No palpable masses or nipple discharge bilaterally.  PELVIC: Normal external genitalia without lesions.  Normal hair distribution.  Adequate perineal body, normal urethral meatus.  Vagina moist and well rugated without lesions or discharge.  Cervix pink, without lesions, discharge or tenderness.  No significant cystocele or rectocele.  Bimanual exam shows uterus to be normal size, regular, mobile and nontender.  Adnexa without masses or tenderness.      Assessment:     1. Encounter for annual routine gynecological examination  Liquid-Based Pap Smear, Screening   2. Encounter for other general counseling or advice on contraception  norethindrone-ethinyl estradiol (BLISOVI FE 1/20, 28,) 1 mg-20 mcg (21)/75 mg (7) per tablet   3. Breast cancer screening other than mammogram     4. Need for HPV vaccination           Plan:     1. Clinical breast exam performed.  2. Pap today. Declines STD screening.  3. Mammogram at 40.  4. DEXA at 65.  5. Colonoscopy at 45.  The risks of, benefits of, and alternatives of various forms of contraception were discussed at this visit. After a discussion of the R/B/A of fertility awareness, barrier contraception, hormonal pills, injections, patches, rings, " hormonal and non-hormonal IUDs, and the subdermal implant, all of  questions were answered, and she has opted for oral contraceptive pills.    Follow up in about 1 year (around 3/31/2023) for annual well woman exam or as needed.    Counseling:     Patient was counseled today on current ASCCP pap guidelines, the recommendation for yearly pelvic exams, healthy diet and exercise routines, breast self awareness.She is to see her PCP for other health maintenance.     Use of the [employer] Patient Portal discussed and encouraged during today's visit.         Lis Gray MD  [facility]  03/31/2022

## 2023-08-28 ENCOUNTER — ANNUAL EXAM (OUTPATIENT)
Dept: OBGYN CLINIC | Facility: CLINIC | Age: 28
End: 2023-08-28
Payer: COMMERCIAL

## 2023-08-28 VITALS
DIASTOLIC BLOOD PRESSURE: 70 MMHG | SYSTOLIC BLOOD PRESSURE: 110 MMHG | WEIGHT: 142.6 LBS | HEIGHT: 64 IN | BODY MASS INDEX: 24.34 KG/M2

## 2023-08-28 DIAGNOSIS — Z11.3 SCREENING EXAMINATION FOR STD (SEXUALLY TRANSMITTED DISEASE): ICD-10-CM

## 2023-08-28 DIAGNOSIS — Z01.411 ENCOUNTER FOR GYNECOLOGICAL EXAMINATION (GENERAL) (ROUTINE) WITH ABNORMAL FINDINGS: Primary | ICD-10-CM

## 2023-08-28 DIAGNOSIS — Q52.4 VAGINAL ANOMALY: ICD-10-CM

## 2023-08-28 PROCEDURE — 87491 CHLMYD TRACH DNA AMP PROBE: CPT | Performed by: PHYSICIAN ASSISTANT

## 2023-08-28 PROCEDURE — 99385 PREV VISIT NEW AGE 18-39: CPT | Performed by: PHYSICIAN ASSISTANT

## 2023-08-28 PROCEDURE — 87591 N.GONORRHOEAE DNA AMP PROB: CPT | Performed by: PHYSICIAN ASSISTANT

## 2023-08-28 RX ORDER — CETIRIZINE HYDROCHLORIDE 10 MG/1
10 TABLET, CHEWABLE ORAL DAILY
COMMUNITY

## 2023-08-28 NOTE — PROGRESS NOTES
ASSESSMENT & PLAN: Nancy Caba is a 24 y.o. Adonis Bollard with abnormal gynecologic exam.    1.  Routine well woman exam done today  2. Pap and HPV:  The patient's last pap was 2022 in care everywhere. It was normal.    Pap was not done today. Current ASCCP Guidelines reviewed. 3.  STD testing  was done   4. Gardasil vaccine series is recommended  5. The following were reviewed in today's visit: breast self exam, STD testing, adequate intake of calcium and vitamin D, exercise, healthy diet and age-appropriate recommendations regarding screenings and prevention. 6.  Briefly discussed contraceptive options and her history of anemia. Patient states that she has not had any recent problems with anemia and her menstruation is regular with moderate bleeding 2 days and then tapers. She did not like how OCP made her feel when she took it years back and at this time prefers to continue monitoring CBC with her PCP and condoms for contraception. She expressed some interest in possibly IUD in the future so we discussed this briefly. 7.  Patient has a soft, fleshy projection appearing to arise just around the vaginal introitus but difficult to tell point of origin. She has had this for the past few years monitored by her previous GYN. It is asymptomatic but states that she recalls it is always there and she thinks that it has gotten bigger over time. We will have patient schedule consultation with Dr. Denisse Noonan to discuss further. RTO Dr. Denisse Noonan consult vaginal anomaly, 1 year for annual    CC: Annual Gynecologic Examination    HPI: Nancy Caba is a 24 y.o. Adonis Bollard who presents for annual gynecologic examination. She is a new patient with our office, last seen in [facility]. She has the following concerns:  States she has an extra piece of tissue in vaginal area past few years, monitoring by her former GYN. States doesn't cause her pain, does not itch or bleed, but thinks grown in size.     She has no active medical problems. Takes OTC zyrtec for allergies. Healthy diet Yes; drinks primarily water during the day  Exercise Yes ; 3-5 x a week, yoga, pilates. Vitamins No    Patient's last menstrual period was 2023. She has no menstrual issues. Previously on OCP's for iron def anemia d/y heavy menstruation when she was younger. Menses frequency: regular, about every 26 days. Length of bleedin-6 days  Bleeding quality: day 2-3 fairly heavy, saturating through pad/tampon every 3-4 hrs at heaviest (super plus)  Sxs with menses: fatigue, mild cramping during menstruation. Bloating. , breast tenderness, moodiness prior. Denies intermenstrual bleeding. Health Maintenance:      She does not perform regular monthly self breast exams. Denies breast pain, lump, skin change or nipple discharge. She feels safe at home. Past Medical History:   Diagnosis Date   • Anemia    • COVID-19 2021       Past Surgical History:   Procedure Laterality Date   • WISDOM TOOTH EXTRACTION         OB/Gyn History:    Pt does not have menstrual issues. History of sexually transmitted infection: No.  History of abnormal pap smears: No .    Patient is currently sexually active. The current method of family planning is condoms.      OB History        0    Para        Term                AB   0    Living   0       SAB   0    IAB   0    Ectopic   0    Multiple        Live Births   0                 Family History   Problem Relation Age of Onset   • No Known Problems Mother    • No Known Problems Father    • Breast cancer Other        Family history of Breast/Uterine/Ovarian/Colon Cancer:  As above    Social History:  Social History     Socioeconomic History   • Marital status: Single     Spouse name: Not on file   • Number of children: Not on file   • Years of education: Not on file   • Highest education level: Not on file   Occupational History   • Not on file   Tobacco Use   • Smoking status: Never   • Smokeless tobacco: Never   Vaping Use   • Vaping Use: Never used   Substance and Sexual Activity   • Alcohol use: Yes   • Drug use: Never   • Sexual activity: Not Currently     Partners: Male     Birth control/protection: None   Other Topics Concern   • Not on file   Social History Narrative   • Not on file     Social Determinants of Health     Financial Resource Strain: Not on file   Food Insecurity: Not on file   Transportation Needs: Not on file   Physical Activity: Not on file   Stress: Not on file   Social Connections: Not on file   Intimate Partner Violence: Not on file   Housing Stability: Not on file         Allergies   Allergen Reactions   • Benadryl [Diphenhydramine] Hives      Had  Intravenously    • Other Hives         Current Outpatient Medications:   •  cetirizine (ZyrTEC) 10 MG chewable tablet, Chew 10 mg daily, Disp: , Rfl:     Review of Systems:  Constitutional :no fever, feels well, no tiredness, no recent weight gain or loss  ENT: no ear ache, no loss of hearing, no nosebleeds or nasal discharge, no sore throat or hoarseness. Cardiovascular: no complaints of slow or fast heart beat, no chest pain, no palpitations, no leg claudication or lower extremity edema. Respiratory: no complaints of shortness of shortness of breath, no ARIAS  Breasts:no complaints of breast pain, breast lump, or nipple discharge  Gastrointestinal: no complaints of abdominal pain, constipation, nausea, vomiting, or diarrhea or bloody stools  Genitourinary : vaginal abnormality as in HPI; no complaints of dysuria, incontinence, pelvic pain, no dysmenorrhea, vaginal discharge/itch/odor or abnormal vaginal bleeding. Musculoskeletal: no complaints of arthralgia, no myalgia, no joint swelling or stiffness, no limb pain or swelling.   Integumentary: no complaints of skin rash or lesion, itching or dry skin  Neurological: no complaints of headache, no confusion, no numbness or tingling, no dizziness or fainting  Mental Health: no anxiety, depression, SI    Objective      /70 (BP Location: Left arm, Patient Position: Sitting, Cuff Size: Large)   Ht 5' 4" (1.626 m)   Wt 64.7 kg (142 lb 9.6 oz)   LMP 08/21/2023   Breastfeeding No   BMI 24.48 kg/m²     General:   appears stated age, cooperative, alert normal mood and affect. BMI 24.48   Neck: normal, supple,trachea midline, no masses. Thyroid palpated normal.   Heart: regular rate and rhythm, S1, S2 normal, no murmur, click, rub or gallop   Lungs: clear to auscultation bilaterally   Breasts: normal appearance, no masses or tenderness, No nipple retraction or dimpling, No nipple discharge or bleeding, No axillary or supraclavicular adenopathy, Normal to palpation without dominant masses   Abdomen: soft, non-tender, without masses or organomegaly   Vulva:  Flesh-colored, soft, floppy projection that is externally visible but seeming to arise from vaginal introitus or urethral tissue, difficult to tell origin. It is not red, swollen, firm, tender or discolored. Rest of vulva appear normal    Vagina: normal vagina, no discharge, exudate, lesion, or erythema   Urethra: normal   Cervix: Normal, no discharge. Nontender. Uterus: normal   Adnexa: no mass, fullness, tenderness   Lymphatic palpation of lymph nodes in neck, axilla, groin and/or other locations: no lymphadenopathy or masses noted   Skin normal skin turgor and no rashes.    Psychiatric orientation to person, place, and time: normal. mood and affect: normal

## 2023-08-29 LAB
C TRACH DNA SPEC QL NAA+PROBE: NEGATIVE
N GONORRHOEA DNA SPEC QL NAA+PROBE: NEGATIVE

## 2023-08-31 ENCOUNTER — OFFICE VISIT (OUTPATIENT)
Dept: FAMILY MEDICINE CLINIC | Facility: OTHER | Age: 28
End: 2023-08-31
Payer: COMMERCIAL

## 2023-08-31 ENCOUNTER — OFFICE VISIT (OUTPATIENT)
Dept: OBGYN CLINIC | Facility: CLINIC | Age: 28
End: 2023-08-31
Payer: COMMERCIAL

## 2023-08-31 VITALS
WEIGHT: 141 LBS | DIASTOLIC BLOOD PRESSURE: 64 MMHG | TEMPERATURE: 98.4 F | OXYGEN SATURATION: 99 % | SYSTOLIC BLOOD PRESSURE: 106 MMHG | HEART RATE: 59 BPM | BODY MASS INDEX: 24.07 KG/M2 | RESPIRATION RATE: 13 BRPM | HEIGHT: 64 IN

## 2023-08-31 VITALS
SYSTOLIC BLOOD PRESSURE: 102 MMHG | DIASTOLIC BLOOD PRESSURE: 62 MMHG | HEIGHT: 64 IN | BODY MASS INDEX: 23.93 KG/M2 | WEIGHT: 140.2 LBS

## 2023-08-31 DIAGNOSIS — N90.89 VULVAR LESION: Primary | ICD-10-CM

## 2023-08-31 DIAGNOSIS — Z13.6 SCREENING FOR CARDIOVASCULAR CONDITION: ICD-10-CM

## 2023-08-31 DIAGNOSIS — Z13.1 SCREENING FOR DIABETES MELLITUS: ICD-10-CM

## 2023-08-31 DIAGNOSIS — Z23 ENCOUNTER FOR IMMUNIZATION: ICD-10-CM

## 2023-08-31 DIAGNOSIS — R53.83 FATIGUE, UNSPECIFIED TYPE: ICD-10-CM

## 2023-08-31 DIAGNOSIS — Z00.00 ANNUAL PHYSICAL EXAM: Primary | ICD-10-CM

## 2023-08-31 PROCEDURE — 56605 BIOPSY OF VULVA/PERINEUM: CPT | Performed by: OBSTETRICS & GYNECOLOGY

## 2023-08-31 PROCEDURE — 90715 TDAP VACCINE 7 YRS/> IM: CPT

## 2023-08-31 PROCEDURE — 99385 PREV VISIT NEW AGE 18-39: CPT

## 2023-08-31 PROCEDURE — 90471 IMMUNIZATION ADMIN: CPT

## 2023-08-31 PROCEDURE — 88305 TISSUE EXAM BY PATHOLOGIST: CPT | Performed by: PATHOLOGY

## 2023-08-31 NOTE — PROGRESS NOTES
Assessment/Plan:     Diagnoses and all orders for this visit:    Vulvar lesion      72-year-old female  Vulvar lesion elongated at the hymen desire removal  Plan  Vulvar lesion removed  We will call patient with result  Return to office for annual exam    Subjective:      Patient ID: Sravanthi Correa is a 24 y.o. female. HPI  Patient seen evaluated present to the office today for vulvar lesion removal that causing discomfort and irritation  Noted on the time of the prior annual exam  The following portions of the patient's history were reviewed and updated as appropriate: allergies, current medications, past family history, past medical history, past social history, past surgical history and problem list.    Review of Systems      Objective:      /62 (BP Location: Left arm, Patient Position: Sitting, Cuff Size: Adult)   Ht 5' 4" (1.626 m)   Wt 63.6 kg (140 lb 3.2 oz)   LMP 08/21/2023 (Exact Date)   BMI 24.07 kg/m²          Physical Exam  Genitourinary:         Comments: Elongated lesion coming from the hymen causing discomfort patient desired removal        Biopsy    Date/Time: 8/31/2023 11:15 AM    Performed by: Jennifer Tripathi MD  Authorized by: Jennifer Tripathi MD  Universal Protocol:  Consent: Verbal consent obtained. Risks and benefits: risks, benefits and alternatives were discussed  Consent given by: patient  Time out: Immediately prior to procedure a "time out" was called to verify the correct patient, procedure, equipment, support staff and site/side marked as required. Patient understanding: patient states understanding of the procedure being performed  Patient consent: the patient's understanding of the procedure matches consent given  Procedure consent: procedure consent matches procedure scheduled  Patient identity confirmed: verbally with patient      Procedure Details - Lesion Biopsy: Body area:   Anogenital    Anogenital location:  Vulva    Vaginal Lesion: Yes      Biopsy method: shave biopsy      Biopsy tissue type: mucous membrane     Lidocaine with epinephrine was used for anesthesia following Betadine following the lesion was grasped with pickup removed from the base with scalpel #11 anesthesia obtained using 2 interrupted 4-0 Vicryl suture good hemostasis was noted bacitracin ointment applied specimen sent to pathology patient tolerated procedure well postop care discussed with patient

## 2023-08-31 NOTE — PROGRESS NOTES
ADULT ANNUAL [facility]    NAME: Allegra Chairez  AGE: 34 y.o. SEX: female  : 1995     DATE: 23     Assessment and Plan:     Problem List Items Addressed This Visit    None  Visit Diagnoses     Annual physical exam    -  Primary    Encounter for immunization        Relevant Orders    TDAP VACCINE GREATER THAN OR EQUAL TO 6YO IM (Completed)    Screening for cardiovascular condition        Relevant Orders    Lipid Panel with Direct LDL reflex (Completed)    Screening for diabetes mellitus        Relevant Orders    Comprehensive metabolic panel (Completed)    Fatigue, unspecified type        Relevant Orders    TSH, 3rd generation with Free T4 reflex (Completed)    Vitamin D 25 hydroxy (Completed)    CBC and differential (Completed)    Iron Panel (Includes Ferritin, Iron Sat%, Iron, and TIBC) (Completed)    Vitamin B12 (Completed)          Immunizations and preventive care screenings were discussed with patient today. Appropriate education was printed on patient's after visit summary. Counseling:  Alcohol/drug use: discussed moderation in alcohol intake, the recommendations for healthy alcohol use, and avoidance of illicit drug use. Dental Health: discussed importance of regular tooth brushing, flossing, and dental visits. Injury prevention: discussed safety/seat belts, safety helmets, smoke detectors, carbon dioxide detectors, and smoking near bedding or upholstery. Sexual health: discussed sexually transmitted diseases, partner selection, use of condoms, avoidance of unintended pregnancy, and contraceptive alternatives. Exercise: the importance of regular exercise/physical activity was discussed. Recommend exercise 3-5 times per week for at least 30 minutes.              Emotional and Mental Well-being, Sleep, Connectedness Assessment and Intervention:    Sleep/stress assessment performed    Depression and anxiety screening performed and reviewed      Tobacco and Toxic Substance Assessment and Intervention:     Tobacco use screening performed    Alcohol and drug use screening performed         Return in about 1 year (around 8/31/2024). Chief Complaint:     Chief Complaint   Patient presents with   • Physical Exam     Annual blood work      History of Present Illness:     Adult Annual Physical   Patient here for a comprehensive physical exam. The patient reports problems - fatigue, intermittent brain fog. Diet and Physical Activity  Diet/Nutrition: well balanced diet and consuming 3-5 servings of fruits/vegetables daily. Exercise: walking, moderate cardiovascular exercise, strength training exercises, 3-4 times a week on average and 1-2 hours on average. Depression Screening  PHQ-2/9 Depression Screening    Little interest or pleasure in doing things: 0 - not at all  Feeling down, depressed, or hopeless: 0 - not at all  PHQ-2 Score: 0  PHQ-2 Interpretation: Negative depression screen       General Health  Sleep: sleeps well, gets more than 8 hours of sleep on average and unrefreshing sleep. Hearing: normal - bilateral.  Vision: most recent eye exam <1 year ago and wears contacts. Dental: regular dental visits, brushes teeth twice daily and flosses daily. /GYN Health  Last menstrual period: 8/21/23  Contraceptive method: previously on hormonal birth control, d/cd in 2020. History of STDs?: no.     Review of Systems:     Review of Systems   Constitutional: Negative for chills and fever. HENT: Negative for congestion, nosebleeds and rhinorrhea. Respiratory: Negative for shortness of breath. Cardiovascular: Negative for chest pain and leg swelling. Gastrointestinal: Negative for constipation, diarrhea and vomiting. Genitourinary: Negative for dysuria, hematuria and menstrual problem. Skin: Negative for rash. Neurological: Positive for headaches (premenstrual only).  Negative for dizziness and light-headedness. Psychiatric/Behavioral: Negative for sleep disturbance. All other systems reviewed and are negative. Past Medical History:     Past Medical History:   Diagnosis Date   • Anemia    • COVID-19 02/2021      Past Surgical History:     Past Surgical History:   Procedure Laterality Date   • WISDOM TOOTH EXTRACTION  2015      Social History:     Social History     Socioeconomic History   • Marital status: Single     Spouse name: None   • Number of children: None   • Years of education: None   • Highest education level: None   Occupational History   • None   Tobacco Use   • Smoking status: Never   • Smokeless tobacco: Never   Vaping Use   • Vaping Use: Never used   Substance and Sexual Activity   • Alcohol use: Yes   • Drug use: Never   • Sexual activity: Not Currently     Partners: Male     Birth control/protection: None   Other Topics Concern   • None   Social History Narrative   • None     Social Determinants of Health     Financial Resource Strain: Not on file   Food Insecurity: Not on file   Transportation Needs: Not on file   Physical Activity: Not on file   Stress: Not on file   Social Connections: Not on file   Intimate Partner Violence: Not on file   Housing Stability: Not on file      Family History:     Family History   Problem Relation Age of Onset   • No Known Problems Mother    • No Known Problems Father    • Breast cancer Other       Current Medications:     Current Outpatient Medications   Medication Sig Dispense Refill   • cetirizine (ZyrTEC) 10 MG chewable tablet Chew 10 mg daily       No current facility-administered medications for this visit. Allergies: Allergies   Allergen Reactions   • Benadryl [Diphenhydramine] Hives      Had  Intravenously    • Other Hives      Physical Exam:     /64   Pulse 59   Temp 98.4 °F (36.9 °C)   Resp 13   Ht 5' 4" (1.626 m)   Wt 64 kg (141 lb)   LMP 08/21/2023   SpO2 99%   BMI 24.20 kg/m²     Physical Exam  Vitals reviewed. Constitutional:       General: She is not in acute distress. Appearance: Normal appearance. She is not ill-appearing, toxic-appearing or diaphoretic. HENT:      Right Ear: Tympanic membrane, ear canal and external ear normal.      Left Ear: Tympanic membrane, ear canal and external ear normal.      Nose: Nose normal. No congestion or rhinorrhea. Mouth/Throat:      Mouth: Mucous membranes are moist.      Pharynx: Oropharynx is clear. No oropharyngeal exudate or posterior oropharyngeal erythema. Eyes:      General:         Right eye: No discharge. Left eye: No discharge. Extraocular Movements: Extraocular movements intact. Conjunctiva/sclera: Conjunctivae normal.      Pupils: Pupils are equal, round, and reactive to light. Cardiovascular:      Rate and Rhythm: Normal rate and regular rhythm. Heart sounds: Normal heart sounds. Pulmonary:      Effort: Pulmonary effort is normal. No respiratory distress. Breath sounds: Normal breath sounds. No stridor. No wheezing, rhonchi or rales. Abdominal:      General: Abdomen is flat. Bowel sounds are normal. There is no distension. Palpations: Abdomen is soft. Tenderness: There is no abdominal tenderness. There is no guarding. Musculoskeletal:         General: Normal range of motion. Right lower leg: No edema. Left lower leg: No edema. Skin:     General: Skin is warm and dry. Neurological:      General: No focal deficit present. Mental Status: She is alert and oriented to person, place, and time. Mental status is at baseline. Cranial Nerves: No cranial nerve deficit. Motor: No weakness.       Gait: Gait normal.   Psychiatric:         Mood and Affect: Mood normal.         Behavior: Behavior normal.          Micaela Baumann DO   [place] [id number] [address]

## 2023-09-01 ENCOUNTER — APPOINTMENT (OUTPATIENT)
Dept: LAB | Facility: CLINIC | Age: 28
End: 2023-09-01
Payer: COMMERCIAL

## 2023-09-01 DIAGNOSIS — Z13.1 SCREENING FOR DIABETES MELLITUS: ICD-10-CM

## 2023-09-01 DIAGNOSIS — Z13.6 SCREENING FOR CARDIOVASCULAR CONDITION: ICD-10-CM

## 2023-09-01 DIAGNOSIS — R53.83 FATIGUE, UNSPECIFIED TYPE: ICD-10-CM

## 2023-09-01 LAB
25(OH)D3 SERPL-MCNC: 39.6 NG/ML (ref 30–100)
ALBUMIN SERPL BCP-MCNC: 4.5 G/DL (ref 3.5–5)
ALP SERPL-CCNC: 69 U/L (ref 34–104)
ALT SERPL W P-5'-P-CCNC: 15 U/L (ref 7–52)
ANION GAP SERPL CALCULATED.3IONS-SCNC: 12 MMOL/L
AST SERPL W P-5'-P-CCNC: 22 U/L (ref 13–39)
BASOPHILS # BLD AUTO: 0.1 THOUSANDS/ÂΜL (ref 0–0.1)
BASOPHILS NFR BLD AUTO: 2 % (ref 0–1)
BILIRUB SERPL-MCNC: 1.28 MG/DL (ref 0.2–1)
BUN SERPL-MCNC: 10 MG/DL (ref 5–25)
CALCIUM SERPL-MCNC: 9.5 MG/DL (ref 8.4–10.2)
CHLORIDE SERPL-SCNC: 102 MMOL/L (ref 96–108)
CHOLEST SERPL-MCNC: 206 MG/DL
CO2 SERPL-SCNC: 25 MMOL/L (ref 21–32)
CREAT SERPL-MCNC: 0.81 MG/DL (ref 0.6–1.3)
EOSINOPHIL # BLD AUTO: 0.37 THOUSAND/ÂΜL (ref 0–0.61)
EOSINOPHIL NFR BLD AUTO: 6 % (ref 0–6)
ERYTHROCYTE [DISTWIDTH] IN BLOOD BY AUTOMATED COUNT: 11.6 % (ref 11.6–15.1)
FERRITIN SERPL-MCNC: 50 NG/ML (ref 11–307)
GFR SERPL CREATININE-BSD FRML MDRD: 101 ML/MIN/1.73SQ M
GLUCOSE P FAST SERPL-MCNC: 77 MG/DL (ref 65–99)
HCT VFR BLD AUTO: 44.7 % (ref 34.8–46.1)
HDLC SERPL-MCNC: 102 MG/DL
HGB BLD-MCNC: 14.5 G/DL (ref 11.5–15.4)
IMM GRANULOCYTES # BLD AUTO: 0.09 THOUSAND/UL (ref 0–0.2)
IMM GRANULOCYTES NFR BLD AUTO: 1 % (ref 0–2)
IRON SATN MFR SERPL: 79 % (ref 15–50)
IRON SERPL-MCNC: 237 UG/DL (ref 50–212)
LDLC SERPL CALC-MCNC: 97 MG/DL (ref 0–100)
LYMPHOCYTES # BLD AUTO: 2.13 THOUSANDS/ÂΜL (ref 0.6–4.47)
LYMPHOCYTES NFR BLD AUTO: 32 % (ref 14–44)
MCH RBC QN AUTO: 30.5 PG (ref 26.8–34.3)
MCHC RBC AUTO-ENTMCNC: 32.4 G/DL (ref 31.4–37.4)
MCV RBC AUTO: 94 FL (ref 82–98)
MONOCYTES # BLD AUTO: 0.56 THOUSAND/ÂΜL (ref 0.17–1.22)
MONOCYTES NFR BLD AUTO: 8 % (ref 4–12)
NEUTROPHILS # BLD AUTO: 3.49 THOUSANDS/ÂΜL (ref 1.85–7.62)
NEUTS SEG NFR BLD AUTO: 51 % (ref 43–75)
NRBC BLD AUTO-RTO: 0 /100 WBCS
PLATELET # BLD AUTO: 279 THOUSANDS/UL (ref 149–390)
PMV BLD AUTO: 9.7 FL (ref 8.9–12.7)
POTASSIUM SERPL-SCNC: 4.2 MMOL/L (ref 3.5–5.3)
PROT SERPL-MCNC: 7 G/DL (ref 6.4–8.4)
RBC # BLD AUTO: 4.75 MILLION/UL (ref 3.81–5.12)
SODIUM SERPL-SCNC: 139 MMOL/L (ref 135–147)
TIBC SERPL-MCNC: 300 UG/DL (ref 250–450)
TRIGL SERPL-MCNC: 36 MG/DL
TSH SERPL DL<=0.05 MIU/L-ACNC: 2.17 UIU/ML (ref 0.45–4.5)
UIBC SERPL-MCNC: 63 UG/DL (ref 155–355)
VIT B12 SERPL-MCNC: 340 PG/ML (ref 180–914)
WBC # BLD AUTO: 6.74 THOUSAND/UL (ref 4.31–10.16)

## 2023-09-01 PROCEDURE — 82306 VITAMIN D 25 HYDROXY: CPT

## 2023-09-01 PROCEDURE — 80061 LIPID PANEL: CPT

## 2023-09-01 PROCEDURE — 84443 ASSAY THYROID STIM HORMONE: CPT

## 2023-09-01 PROCEDURE — 80053 COMPREHEN METABOLIC PANEL: CPT

## 2023-09-01 PROCEDURE — 36415 COLL VENOUS BLD VENIPUNCTURE: CPT

## 2023-09-01 PROCEDURE — 82728 ASSAY OF FERRITIN: CPT

## 2023-09-01 PROCEDURE — 83540 ASSAY OF IRON: CPT

## 2023-09-01 PROCEDURE — 85025 COMPLETE CBC W/AUTO DIFF WBC: CPT

## 2023-09-01 PROCEDURE — 83550 IRON BINDING TEST: CPT

## 2023-09-01 PROCEDURE — 82607 VITAMIN B-12: CPT

## 2023-09-05 ENCOUNTER — TELEPHONE (OUTPATIENT)
Dept: FAMILY MEDICINE CLINIC | Facility: OTHER | Age: 28
End: 2023-09-05

## 2023-09-06 PROCEDURE — 88305 TISSUE EXAM BY PATHOLOGIST: CPT | Performed by: PATHOLOGY

## 2024-06-25 ENCOUNTER — OFFICE VISIT (OUTPATIENT)
Dept: FAMILY MEDICINE CLINIC | Facility: CLINIC | Age: 29
End: 2024-06-25
Payer: COMMERCIAL

## 2024-06-25 VITALS
SYSTOLIC BLOOD PRESSURE: 112 MMHG | BODY MASS INDEX: 23.39 KG/M2 | HEIGHT: 64 IN | DIASTOLIC BLOOD PRESSURE: 70 MMHG | HEART RATE: 89 BPM | WEIGHT: 137 LBS | OXYGEN SATURATION: 99 %

## 2024-06-25 DIAGNOSIS — Z00.00 ANNUAL PHYSICAL EXAM: ICD-10-CM

## 2024-06-25 DIAGNOSIS — Z83.49 FAMILY HISTORY OF THYROID DISEASE: ICD-10-CM

## 2024-06-25 DIAGNOSIS — Z28.21 HUMAN PAPILLOMA VIRUS (HPV) VACCINATION DECLINED: ICD-10-CM

## 2024-06-25 DIAGNOSIS — Z01.84 IMMUNITY STATUS TESTING: ICD-10-CM

## 2024-06-25 DIAGNOSIS — Z11.3 SCREENING EXAMINATION FOR STD (SEXUALLY TRANSMITTED DISEASE): ICD-10-CM

## 2024-06-25 DIAGNOSIS — Z11.1 SCREENING-PULMONARY TB: ICD-10-CM

## 2024-06-25 DIAGNOSIS — Z76.89 ENCOUNTER TO ESTABLISH CARE: Primary | ICD-10-CM

## 2024-06-25 DIAGNOSIS — Z83.438 FAMILY HISTORY OF HYPERLIPIDEMIA: ICD-10-CM

## 2024-06-25 DIAGNOSIS — Z13.1 SCREENING FOR DIABETES MELLITUS: ICD-10-CM

## 2024-06-25 DIAGNOSIS — Z88.9 MULTIPLE ALLERGIES: ICD-10-CM

## 2024-06-25 DIAGNOSIS — D50.9 IRON DEFICIENCY ANEMIA, UNSPECIFIED IRON DEFICIENCY ANEMIA TYPE: ICD-10-CM

## 2024-06-25 DIAGNOSIS — R53.83 FATIGUE, UNSPECIFIED TYPE: ICD-10-CM

## 2024-06-25 PROCEDURE — 99395 PREV VISIT EST AGE 18-39: CPT | Performed by: FAMILY MEDICINE

## 2024-06-25 NOTE — PROGRESS NOTES
Assessment/Plan:   Diagnoses and all orders for this visit:    Encounter to establish care  Annual physical exam  - reviewed PMHx, PSHx, meds, allergies, FHx, Soc Hx and Sexual Hx   - UTD with COVID IMMs and Boosters x1  - UTD with Tdap (2023)   - declined HPV series   - due for labs - script given   - interested in STD screening in the office today - script given   - UTD with annual GYN exam (8/2023)   - UTD with Cervical Ca screening - PAP was in 2022  - due for Breast Ca screening at 41yo  - due for Colon Ca screening at 48yo    - discussed diet and encouraged exercise  - UTD with Optho and Dental   - RTO in 1yr for annual or sooner if with abnml labs - pt aware and agreeable     Human papilloma virus (HPV) vaccination declined     Screening-pulmonary TB  -     TB Skin Test  - pt will RTO 6/26/2024 to have PPD placed and RTO on 6/28/2024 to have it read     Iron deficiency anemia, unspecified iron deficiency anemia type  -     CBC and differential  -     Iron Panel (Includes Ferritin, Iron Sat%, Iron, and TIBC); Future    Family history of thyroid disease  -     TSH, 3rd generation with Free T4 reflex    Family history of hyperlipidemia  -     Lipid panel; Future    Immunity status testing  -     Hepatitis B Immunity Panel; Future  -     Measles/Mumps/Rubella Immunity; Future  -     Varicella zoster antibody, IgG; Future    Screening examination for STD (sexually transmitted disease)  -     HIV 1/2 AG/AB w Reflex [facility] for 2 yr old and above; Future  -     RPR-Syphilis Screening (Total Syphilis IGG/IGM); Future  -     Chlamydia/GC amplified DNA by PCR; Future  -     Hepatitis panel, acute; Future    Fatigue, unspecified type  -     Vitamin D 25 hydroxy; Future  -     Vitamin B12; Future    Screening for diabetes mellitus  -     Comprehensive metabolic panel    Multiple allergies  -     Ambulatory Referral to Allergy; Future          Subjective:    Patient ID: Jaelyn Peters is a 28 y.o. female.  Jaelyn Peters  "is a 28 y.o. female who presents to the office to establish care and for an annual exam  - prior PCP: [facility]  - PMHx: Anemia, Allergies   - allergies: Benadryl (hives)   - Meds: Zyrtec   - PSHx: [place] teeth extrction   - FHx: M (asthma 2/2 allergies, HL), F (asthma), MA (thyroid dz), denies Fhx of Cervical/Colon Ca   - Immunizations: UTD with COVID IMMs and Booster x1, UTD with Tdap (2023)   - GYN Hx: [facility], UTD with annual exam (8/2023), UTD with Cervical Ca screening (2022), FDLMP: 6/6/2024 - gets monthly  - Hm: due for Breast Ca screening at 43yo, due for Colon Ca screening at 46yo   - diet/exercise: active, balanced diet   - social: denies tob, social EtOH, 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    Review of Systems  as per HPI    Objective:  /70   Pulse 89   Ht 5' 4\" (1.626 m)   Wt 62.1 kg (137 lb)   LMP 06/06/2024 (Exact Date)   SpO2 99%   BMI 23.52 kg/m²    Physical Exam  Vitals reviewed.   Constitutional:       General: She is not in acute distress.     Appearance: Normal appearance. She is not ill-appearing, toxic-appearing or diaphoretic.   HENT:      Head: Normocephalic and atraumatic.      Right Ear: External ear normal.      Left Ear: External ear normal.      Nose: Nose normal.   Eyes:      General: No scleral icterus.        Right eye: No discharge.         Left eye: No discharge.      Extraocular Movements: Extraocular movements intact.      " Conjunctiva/sclera: Conjunctivae normal.   Cardiovascular:      Rate and Rhythm: Normal rate and regular rhythm.      Heart sounds: Normal heart sounds. No murmur heard.     No friction rub. No gallop.   Pulmonary:      Effort: Pulmonary effort is normal. No respiratory distress.      Breath sounds: Normal breath sounds. No stridor. No wheezing, rhonchi or rales.   Abdominal:      Palpations: Abdomen is soft.   Musculoskeletal:         General: Normal range of motion.      Cervical back: Normal range of motion.      Right lower leg: No edema.      Left lower leg: No edema.   Skin:     General: Skin is warm.   Neurological:      General: No focal deficit present.      Mental Status: She is alert and oriented to person, place, and time.   Psychiatric:         Mood and Affect: Mood normal.         Behavior: Behavior normal.

## 2024-06-26 ENCOUNTER — CLINICAL SUPPORT (OUTPATIENT)
Dept: FAMILY MEDICINE CLINIC | Facility: CLINIC | Age: 29
End: 2024-06-26
Payer: COMMERCIAL

## 2024-06-26 ENCOUNTER — APPOINTMENT (OUTPATIENT)
Dept: LAB | Facility: CLINIC | Age: 29
End: 2024-06-26
Payer: COMMERCIAL

## 2024-06-26 DIAGNOSIS — R53.83 FATIGUE, UNSPECIFIED TYPE: ICD-10-CM

## 2024-06-26 DIAGNOSIS — Z11.3 SCREENING EXAMINATION FOR STD (SEXUALLY TRANSMITTED DISEASE): ICD-10-CM

## 2024-06-26 DIAGNOSIS — D50.9 IRON DEFICIENCY ANEMIA, UNSPECIFIED IRON DEFICIENCY ANEMIA TYPE: ICD-10-CM

## 2024-06-26 DIAGNOSIS — Z11.1 PPD SCREENING TEST: Primary | ICD-10-CM

## 2024-06-26 DIAGNOSIS — Z01.84 IMMUNITY STATUS TESTING: ICD-10-CM

## 2024-06-26 DIAGNOSIS — Z83.438 FAMILY HISTORY OF HYPERLIPIDEMIA: ICD-10-CM

## 2024-06-26 LAB
25(OH)D3 SERPL-MCNC: 21.8 NG/ML (ref 30–100)
ALBUMIN SERPL BCG-MCNC: 4.6 G/DL (ref 3.5–5)
ALP SERPL-CCNC: 67 U/L (ref 34–104)
ALT SERPL W P-5'-P-CCNC: 11 U/L (ref 7–52)
ANION GAP SERPL CALCULATED.3IONS-SCNC: 12 MMOL/L (ref 4–13)
AST SERPL W P-5'-P-CCNC: 17 U/L (ref 13–39)
BASOPHILS # BLD AUTO: 0.07 THOUSANDS/ÂΜL (ref 0–0.1)
BASOPHILS NFR BLD AUTO: 1 % (ref 0–1)
BILIRUB SERPL-MCNC: 1.12 MG/DL (ref 0.2–1)
BUN SERPL-MCNC: 12 MG/DL (ref 5–25)
CALCIUM SERPL-MCNC: 9.6 MG/DL (ref 8.4–10.2)
CHLORIDE SERPL-SCNC: 102 MMOL/L (ref 96–108)
CHOLEST SERPL-MCNC: 199 MG/DL
CO2 SERPL-SCNC: 22 MMOL/L (ref 21–32)
CREAT SERPL-MCNC: 0.73 MG/DL (ref 0.6–1.3)
EOSINOPHIL # BLD AUTO: 0.24 THOUSAND/ÂΜL (ref 0–0.61)
EOSINOPHIL NFR BLD AUTO: 4 % (ref 0–6)
ERYTHROCYTE [DISTWIDTH] IN BLOOD BY AUTOMATED COUNT: 12.1 % (ref 11.6–15.1)
FERRITIN SERPL-MCNC: 44 NG/ML (ref 11–307)
GFR SERPL CREATININE-BSD FRML MDRD: 114 ML/MIN/1.73SQ M
GLUCOSE P FAST SERPL-MCNC: 80 MG/DL (ref 65–99)
HAV IGM SER QL: NORMAL
HBV CORE AB SER QL: NORMAL
HBV CORE IGM SER QL: NORMAL
HBV SURFACE AB SER-ACNC: 9.19 MIU/ML
HBV SURFACE AG SER QL: NORMAL
HCT VFR BLD AUTO: 44.2 % (ref 34.8–46.1)
HCV AB SER QL: NORMAL
HDLC SERPL-MCNC: 102 MG/DL
HGB BLD-MCNC: 14.6 G/DL (ref 11.5–15.4)
HIV 1+2 AB+HIV1 P24 AG SERPL QL IA: NORMAL
HIV 2 AB SERPL QL IA: NORMAL
HIV1 AB SERPL QL IA: NORMAL
HIV1 P24 AG SERPL QL IA: NORMAL
IMM GRANULOCYTES # BLD AUTO: 0.1 THOUSAND/UL (ref 0–0.2)
IMM GRANULOCYTES NFR BLD AUTO: 2 % (ref 0–2)
IRON SATN MFR SERPL: 68 % (ref 15–50)
IRON SERPL-MCNC: 212 UG/DL (ref 50–212)
LDLC SERPL CALC-MCNC: 87 MG/DL (ref 0–100)
LYMPHOCYTES # BLD AUTO: 1.95 THOUSANDS/ÂΜL (ref 0.6–4.47)
LYMPHOCYTES NFR BLD AUTO: 29 % (ref 14–44)
MCH RBC QN AUTO: 30.5 PG (ref 26.8–34.3)
MCHC RBC AUTO-ENTMCNC: 33 G/DL (ref 31.4–37.4)
MCV RBC AUTO: 93 FL (ref 82–98)
MONOCYTES # BLD AUTO: 0.62 THOUSAND/ÂΜL (ref 0.17–1.22)
MONOCYTES NFR BLD AUTO: 9 % (ref 4–12)
NEUTROPHILS # BLD AUTO: 3.72 THOUSANDS/ÂΜL (ref 1.85–7.62)
NEUTS SEG NFR BLD AUTO: 55 % (ref 43–75)
NONHDLC SERPL-MCNC: 97 MG/DL
NRBC BLD AUTO-RTO: 0 /100 WBCS
PLATELET # BLD AUTO: 309 THOUSANDS/UL (ref 149–390)
PMV BLD AUTO: 9.7 FL (ref 8.9–12.7)
POTASSIUM SERPL-SCNC: 4.2 MMOL/L (ref 3.5–5.3)
PROT SERPL-MCNC: 7.5 G/DL (ref 6.4–8.4)
RBC # BLD AUTO: 4.78 MILLION/UL (ref 3.81–5.12)
SODIUM SERPL-SCNC: 136 MMOL/L (ref 135–147)
TIBC SERPL-MCNC: 314 UG/DL (ref 250–450)
TREPONEMA PALLIDUM IGG+IGM AB [PRESENCE] IN SERUM OR PLASMA BY IMMUNOASSAY: NORMAL
TRIGL SERPL-MCNC: 52 MG/DL
TSH SERPL DL<=0.05 MIU/L-ACNC: 3.19 UIU/ML (ref 0.45–4.5)
UIBC SERPL-MCNC: 102 UG/DL (ref 155–355)
VIT B12 SERPL-MCNC: 239 PG/ML (ref 180–914)
VZV IGG SER QL IA: NORMAL
WBC # BLD AUTO: 6.7 THOUSAND/UL (ref 4.31–10.16)

## 2024-06-26 PROCEDURE — 86580 TB INTRADERMAL TEST: CPT

## 2024-06-26 PROCEDURE — 36415 COLL VENOUS BLD VENIPUNCTURE: CPT

## 2024-06-26 PROCEDURE — 83540 ASSAY OF IRON: CPT

## 2024-06-26 PROCEDURE — 80074 ACUTE HEPATITIS PANEL: CPT

## 2024-06-26 PROCEDURE — 86706 HEP B SURFACE ANTIBODY: CPT

## 2024-06-26 PROCEDURE — 85025 COMPLETE CBC W/AUTO DIFF WBC: CPT | Performed by: FAMILY MEDICINE

## 2024-06-26 PROCEDURE — 87491 CHLMYD TRACH DNA AMP PROBE: CPT

## 2024-06-26 PROCEDURE — 84443 ASSAY THYROID STIM HORMONE: CPT | Performed by: FAMILY MEDICINE

## 2024-06-26 PROCEDURE — 86787 VARICELLA-ZOSTER ANTIBODY: CPT

## 2024-06-26 PROCEDURE — 86704 HEP B CORE ANTIBODY TOTAL: CPT

## 2024-06-26 PROCEDURE — 82306 VITAMIN D 25 HYDROXY: CPT

## 2024-06-26 PROCEDURE — 87591 N.GONORRHOEAE DNA AMP PROB: CPT

## 2024-06-26 PROCEDURE — 80061 LIPID PANEL: CPT

## 2024-06-26 PROCEDURE — 83550 IRON BINDING TEST: CPT

## 2024-06-26 PROCEDURE — 86780 TREPONEMA PALLIDUM: CPT

## 2024-06-26 PROCEDURE — 87389 HIV-1 AG W/HIV-1&-2 AB AG IA: CPT

## 2024-06-26 PROCEDURE — 86735 MUMPS ANTIBODY: CPT

## 2024-06-26 PROCEDURE — 82607 VITAMIN B-12: CPT

## 2024-06-26 PROCEDURE — 80053 COMPREHEN METABOLIC PANEL: CPT | Performed by: FAMILY MEDICINE

## 2024-06-26 PROCEDURE — 86762 RUBELLA ANTIBODY: CPT

## 2024-06-26 PROCEDURE — 86765 RUBEOLA ANTIBODY: CPT

## 2024-06-26 PROCEDURE — 82728 ASSAY OF FERRITIN: CPT

## 2024-06-27 LAB
C TRACH DNA SPEC QL NAA+PROBE: NEGATIVE
MEV IGG SER IA-ACNC: 67.7 AU/ML
MUV IGG SER IA-ACNC: 14.1 AU/ML
N GONORRHOEA DNA SPEC QL NAA+PROBE: NEGATIVE
RUBV IGG SERPL IA-ACNC: 6.77 INDEX

## 2024-06-28 LAB
INDURATION: 0 MM
TB SKIN TEST: NEGATIVE

## 2024-07-02 ENCOUNTER — TELEPHONE (OUTPATIENT)
Dept: FAMILY MEDICINE CLINIC | Facility: CLINIC | Age: 29
End: 2024-07-02

## 2024-07-02 DIAGNOSIS — E55.9 VITAMIN D DEFICIENCY: Primary | ICD-10-CM

## 2024-07-02 NOTE — TELEPHONE ENCOUNTER
----- Message from Susi Zavala DO sent at 7/2/2024 12:27 PM EDT -----  Tbili elevated but with nml LFTs - 2/2 genetic variant - Gilbert's - cont to monitor annually     Stable Lipids, TSH, Fe panel, Ferritin, CBC, STD panel, titers to MMR and Varicella    Low/nml B12 - advised OTC Bcomplex   Vit D deficiency - eRx for Vit D 50,000IU Qwk x12wks sent to pharmacy on file

## 2025-03-21 ENCOUNTER — OFFICE VISIT (OUTPATIENT)
Dept: OBSTETRICS AND GYNECOLOGY | Facility: CLINIC | Age: 30
End: 2025-03-21
Attending: OBSTETRICS & GYNECOLOGY
Payer: COMMERCIAL

## 2025-03-21 ENCOUNTER — LAB VISIT (OUTPATIENT)
Dept: LAB | Facility: OTHER | Age: 30
End: 2025-03-21
Attending: OBSTETRICS & GYNECOLOGY
Payer: COMMERCIAL

## 2025-03-21 VITALS
DIASTOLIC BLOOD PRESSURE: 77 MMHG | WEIGHT: 147.5 LBS | SYSTOLIC BLOOD PRESSURE: 115 MMHG | HEIGHT: 64 IN | BODY MASS INDEX: 25.18 KG/M2

## 2025-03-21 DIAGNOSIS — Z11.3 SCREENING FOR STDS (SEXUALLY TRANSMITTED DISEASES): ICD-10-CM

## 2025-03-21 DIAGNOSIS — R10.2 PELVIC PAIN IN FEMALE: ICD-10-CM

## 2025-03-21 DIAGNOSIS — Z12.4 SCREENING FOR CERVICAL CANCER: ICD-10-CM

## 2025-03-21 DIAGNOSIS — Z01.419 WELL WOMAN EXAM WITH ROUTINE GYNECOLOGICAL EXAM: Primary | ICD-10-CM

## 2025-03-21 LAB
HIV 1+2 AB+HIV1 P24 AG SERPL QL IA: NEGATIVE
TREPONEMA PALLIDUM IGG+IGM AB [PRESENCE] IN SERUM OR PLASMA BY IMMUNOASSAY: NONREACTIVE

## 2025-03-21 PROCEDURE — 99999 PR PBB SHADOW E&M-EST. PATIENT-LVL III: CPT | Mod: PBBFAC,,, | Performed by: OBSTETRICS & GYNECOLOGY

## 2025-03-21 PROCEDURE — 36415 COLL VENOUS BLD VENIPUNCTURE: CPT | Performed by: OBSTETRICS & GYNECOLOGY

## 2025-03-21 PROCEDURE — 86593 SYPHILIS TEST NON-TREP QUANT: CPT | Performed by: OBSTETRICS & GYNECOLOGY

## 2025-03-21 PROCEDURE — 87389 HIV-1 AG W/HIV-1&-2 AB AG IA: CPT | Performed by: OBSTETRICS & GYNECOLOGY

## 2025-03-21 PROCEDURE — 87491 CHLMYD TRACH DNA AMP PROBE: CPT | Performed by: OBSTETRICS & GYNECOLOGY

## 2025-03-21 RX ORDER — CETIRIZINE HYDROCHLORIDE 10 MG/1
10 TABLET, CHEWABLE ORAL
COMMUNITY

## 2025-03-21 NOTE — PROGRESS NOTES
"Subjective     Patient ID: Lexis Candelario is a 29 y.o. female.    Chief Complaint:  Well Woman      History of Present Illness  HPI  29 y.o.  presents for annual exam.  Overall doing well. Cycles regular, monthly.  Last 5 days.  Not currently on any birth control - stopped OCPs due to mood swings.  Occasionally sexually active, uses condoms.  She also reports RLQ pain off and on over the past few months.  Worse with sex.   No bowel changes.  No fevers/chills.  No n/v.  No other complaints today.      GYN & OB History  Patient's last menstrual period was 02/10/2025 (exact date).   Date of Last Pap: 2022    OB History    Para Term  AB Living   0 0 0 0 0 0   SAB IAB Ectopic Multiple Live Births   0 0 0 0 0   Obstetric Comments      No abnormal pap   No STDs (Hx false + RPR)       Review of Systems  Review of Systems   Constitutional:  Negative for chills and fever.   Respiratory:  Negative for shortness of breath.    Cardiovascular:  Negative for chest pain.   Gastrointestinal:  Negative for abdominal pain, constipation and diarrhea.   Genitourinary:  Positive for pelvic pain. Negative for dyspareunia, dysuria and vaginal discharge.   Musculoskeletal:  Negative for myalgias.   Neurological:  Negative for headaches.          Objective   Physical Exam    /77   Ht 5' 4" (1.626 m)   Wt 66.9 kg (147 lb 7.8 oz)   LMP 02/10/2025 (Exact Date)   BMI 25.32 kg/m²     Gen: NAD  Resp: Normal respiratory effort  Breast: Symmetric, nontender.  No masses.  No skin changes.  No nipple discharge.   Abd: soft, NT  Pelvic: Normal-appearing external female genitalia.  No CMT.  Uterus small, mobile, nontender.  No adnexal masses or tenderness, slight fullness in right adnexa, but not painful to patient.    Ext: normal ROM  Psych: appropriate affect  Neuro: grossly intact         Assessment and Plan     Lexis was seen today for well woman.    Diagnoses and all orders for this visit:    Well woman " exam with routine gynecological exam    Pelvic pain in female  -     US Pelvis Comp with Transvag NON-OB (xpd; Future    Screening for STDs (sexually transmitted diseases)  -     HIV 1/2 Ag/Ab (4th Gen); Future  -     Treponema Pallidium Antibodies IgG, IgM; Future  -     C. trachomatis/N. gonorrhoeae by AMP DNA    Screening for cervical cancer  -     Liquid-Based Pap Smear, Screening          Plan:  Routine annual exam with pap smear and breast exam today.  STD screening.  Contraception counseling done.  Discussed phexxi as non-hormonal option, will consider.  Is also considering copper IUD vs Kyleena (does like to have monthly cycle).  Handouts given, will let me know if she wants to order or rx.   Exam benign today, but will get pelvic US to evaluate right pelvic pain - possible ovarian cyst.    Counseling done, precautions given, all questions answered.  RTC 1 year for annual, or prn.

## 2025-03-22 LAB
C TRACH DNA SPEC QL NAA+PROBE: NOT DETECTED
N GONORRHOEA DNA SPEC QL NAA+PROBE: NOT DETECTED

## 2025-03-26 ENCOUNTER — RESULTS FOLLOW-UP (OUTPATIENT)
Dept: OBSTETRICS AND GYNECOLOGY | Facility: CLINIC | Age: 30
End: 2025-03-26

## 2025-04-01 ENCOUNTER — HOSPITAL ENCOUNTER (OUTPATIENT)
Dept: RADIOLOGY | Facility: OTHER | Age: 30
Discharge: HOME | End: 2025-04-01
Attending: OBSTETRICS & GYNECOLOGY
Payer: COMMERCIAL

## 2025-04-01 DIAGNOSIS — R10.2 PELVIC PAIN IN FEMALE: ICD-10-CM

## 2025-04-01 PROCEDURE — 76830 TRANSVAGINAL US NON-OB: CPT | Mod: TC

## 2025-04-01 PROCEDURE — 76830 TRANSVAGINAL US NON-OB: CPT | Mod: 26,,, | Performed by: RADIOLOGY

## 2025-04-01 PROCEDURE — 76856 US EXAM PELVIC COMPLETE: CPT | Mod: 26,,, | Performed by: RADIOLOGY

## 2025-04-24 ENCOUNTER — OFFICE VISIT (OUTPATIENT)
Dept: INTERNAL MEDICINE | Facility: CLINIC | Age: 30
End: 2025-04-24
Payer: COMMERCIAL

## 2025-04-24 ENCOUNTER — RESULTS FOLLOW-UP (OUTPATIENT)
Dept: INTERNAL MEDICINE | Facility: CLINIC | Age: 30
End: 2025-04-24

## 2025-04-24 ENCOUNTER — LAB VISIT (OUTPATIENT)
Dept: LAB | Facility: HOSPITAL | Age: 30
End: 2025-04-24
Payer: COMMERCIAL

## 2025-04-24 VITALS
SYSTOLIC BLOOD PRESSURE: 104 MMHG | BODY MASS INDEX: 24.69 KG/M2 | HEIGHT: 64 IN | WEIGHT: 144.63 LBS | OXYGEN SATURATION: 99 % | HEART RATE: 75 BPM | DIASTOLIC BLOOD PRESSURE: 62 MMHG

## 2025-04-24 DIAGNOSIS — R10.31 RIGHT LOWER QUADRANT PAIN: Primary | ICD-10-CM

## 2025-04-24 DIAGNOSIS — R10.30 LOWER ABDOMINAL PAIN: ICD-10-CM

## 2025-04-24 DIAGNOSIS — R82.81 PYURIA: ICD-10-CM

## 2025-04-24 DIAGNOSIS — R10.2 PELVIC PAIN: ICD-10-CM

## 2025-04-24 DIAGNOSIS — R10.30 LOWER ABDOMINAL PAIN: Primary | ICD-10-CM

## 2025-04-24 LAB
ABSOLUTE EOSINOPHIL (OHS): 0.32 K/UL
ABSOLUTE MONOCYTE (OHS): 0.65 K/UL (ref 0.3–1)
ABSOLUTE NEUTROPHIL COUNT (OHS): 3.85 K/UL (ref 1.8–7.7)
ALBUMIN SERPL BCP-MCNC: 4.2 G/DL (ref 3.5–5.2)
ALP SERPL-CCNC: 75 UNIT/L (ref 40–150)
ALT SERPL W/O P-5'-P-CCNC: 10 UNIT/L (ref 10–44)
ANION GAP (OHS): 11 MMOL/L (ref 8–16)
AST SERPL-CCNC: 15 UNIT/L (ref 11–45)
B-HCG UR QL: NEGATIVE
BASOPHILS # BLD AUTO: 0.06 K/UL
BASOPHILS NFR BLD AUTO: 0.8 %
BILIRUB SERPL-MCNC: 0.5 MG/DL (ref 0.1–1)
BILIRUB SERPL-MCNC: NEGATIVE MG/DL
BLOOD URINE, POC: NEGATIVE
BUN SERPL-MCNC: 9 MG/DL (ref 6–20)
CALCIUM SERPL-MCNC: 9.4 MG/DL (ref 8.7–10.5)
CHLORIDE SERPL-SCNC: 107 MMOL/L (ref 95–110)
CLARITY, POC UA: ABNORMAL
CO2 SERPL-SCNC: 24 MMOL/L (ref 23–29)
COLOR, POC UA: ABNORMAL
CREAT SERPL-MCNC: 0.8 MG/DL (ref 0.5–1.4)
CTP QC/QA: YES
ERYTHROCYTE [DISTWIDTH] IN BLOOD BY AUTOMATED COUNT: 11.7 % (ref 11.5–14.5)
GFR SERPLBLD CREATININE-BSD FMLA CKD-EPI: >60 ML/MIN/1.73/M2
GLUCOSE SERPL-MCNC: 80 MG/DL (ref 70–110)
GLUCOSE UR QL STRIP: NEGATIVE
HCT VFR BLD AUTO: 42.5 % (ref 37–48.5)
HGB BLD-MCNC: 13.8 GM/DL (ref 12–16)
IMM GRANULOCYTES # BLD AUTO: 0.09 K/UL (ref 0–0.04)
IMM GRANULOCYTES NFR BLD AUTO: 1.3 % (ref 0–0.5)
KETONES UR QL STRIP: NEGATIVE
LEUKOCYTE ESTERASE URINE, POC: ABNORMAL
LYMPHOCYTES # BLD AUTO: 2.14 K/UL (ref 1–4.8)
MCH RBC QN AUTO: 30.1 PG (ref 27–31)
MCHC RBC AUTO-ENTMCNC: 32.5 G/DL (ref 32–36)
MCV RBC AUTO: 93 FL (ref 82–98)
NITRITE, POC UA: NEGATIVE
NUCLEATED RBC (/100WBC) (OHS): 0 /100 WBC
PH, POC UA: 6
PLATELET # BLD AUTO: 283 K/UL (ref 150–450)
PMV BLD AUTO: 9.9 FL (ref 9.2–12.9)
POTASSIUM SERPL-SCNC: 3.8 MMOL/L (ref 3.5–5.1)
PROT SERPL-MCNC: 7.1 GM/DL (ref 6–8.4)
PROTEIN, POC: NEGATIVE
RBC # BLD AUTO: 4.58 M/UL (ref 4–5.4)
RELATIVE EOSINOPHIL (OHS): 4.5 %
RELATIVE LYMPHOCYTE (OHS): 30.1 % (ref 18–48)
RELATIVE MONOCYTE (OHS): 9.1 % (ref 4–15)
RELATIVE NEUTROPHIL (OHS): 54.2 % (ref 38–73)
SODIUM SERPL-SCNC: 142 MMOL/L (ref 136–145)
SPECIFIC GRAVITY, POC UA: 1.03
UROBILINOGEN, POC UA: 0.2
WBC # BLD AUTO: 7.11 K/UL (ref 3.9–12.7)

## 2025-04-24 PROCEDURE — 85025 COMPLETE CBC W/AUTO DIFF WBC: CPT

## 2025-04-24 PROCEDURE — 3074F SYST BP LT 130 MM HG: CPT | Mod: CPTII,S$GLB,, | Performed by: NURSE PRACTITIONER

## 2025-04-24 PROCEDURE — 3078F DIAST BP <80 MM HG: CPT | Mod: CPTII,S$GLB,, | Performed by: NURSE PRACTITIONER

## 2025-04-24 PROCEDURE — 99999 PR PBB SHADOW E&M-EST. PATIENT-LVL III: CPT | Mod: PBBFAC,,, | Performed by: NURSE PRACTITIONER

## 2025-04-24 PROCEDURE — 81002 URINALYSIS NONAUTO W/O SCOPE: CPT | Mod: S$GLB,,, | Performed by: NURSE PRACTITIONER

## 2025-04-24 PROCEDURE — 3008F BODY MASS INDEX DOCD: CPT | Mod: CPTII,S$GLB,, | Performed by: NURSE PRACTITIONER

## 2025-04-24 PROCEDURE — 80053 COMPREHEN METABOLIC PANEL: CPT

## 2025-04-24 PROCEDURE — 87086 URINE CULTURE/COLONY COUNT: CPT | Performed by: NURSE PRACTITIONER

## 2025-04-24 PROCEDURE — 99203 OFFICE O/P NEW LOW 30 MIN: CPT | Mod: S$GLB,,, | Performed by: NURSE PRACTITIONER

## 2025-04-24 PROCEDURE — 81025 URINE PREGNANCY TEST: CPT | Mod: S$GLB,,, | Performed by: NURSE PRACTITIONER

## 2025-04-24 PROCEDURE — 36415 COLL VENOUS BLD VENIPUNCTURE: CPT

## 2025-04-24 PROCEDURE — 1159F MED LIST DOCD IN RCRD: CPT | Mod: CPTII,S$GLB,, | Performed by: NURSE PRACTITIONER

## 2025-04-24 RX ORDER — DICYCLOMINE HYDROCHLORIDE 20 MG/1
20 TABLET ORAL 3 TIMES DAILY PRN
Qty: 40 TABLET | Refills: 0 | Status: SHIPPED | OUTPATIENT
Start: 2025-04-24 | End: 2025-05-24

## 2025-04-24 NOTE — PROGRESS NOTES
"INTERNAL MEDICINE PROGRESS/URGENT CARE NOTE    CHIEF COMPLAINT     Chief Complaint   Patient presents with    Abdominal Pain       HPI     Lexis Candelario is a 29 y.o. female who presents for an urgent visit today.    New pt.     Intermittent lower abd/pelvic pain mostly in RLQ/pelvic area x 3 months. Sharp/cramping in nature.   Had diarrhea this weekend when she had the pain. No blood in stool. + nausea. No fevers or vomiting.   Pain isn't always associated with diarrhea though. Can have normal stools. Thought she may have had a bug ovre the weekend. Stools are starting to return to normal. No melena or hematochezia.   Eating certain things can make it worse.   Saw GYN. US negative.       Problem List[1]     Past Medical History:  Past Medical History:   Diagnosis Date    Sinusitis         Past Surgical History:  Past Surgical History:   Procedure Laterality Date    WISDOM TOOTH EXTRACTION          Allergies:  Review of patient's allergies indicates:   Allergen Reactions    Diphenhydramine Hives and Other (See Comments)     Had  Intravenously    Benadryl allergy decongestant Hives       Home Medications:  Current Medications[2]     Review of Systems:  Review of Systems   Constitutional:  Negative for fever.   Respiratory:  Negative for cough and shortness of breath.    Cardiovascular:  Negative for chest pain.   Gastrointestinal:  Positive for abdominal pain, diarrhea and nausea. Negative for constipation and vomiting.   Genitourinary:  Positive for pelvic pain. Negative for dysuria, hematuria, urgency, vaginal discharge and vaginal pain.   Musculoskeletal:  Negative for back pain.   Neurological:  Negative for weakness and headaches.         PHYSICAL EXAM     Vitals:    04/24/25 0809   BP: 104/62   BP Location: Right arm   Patient Position: Sitting   Pulse: 75   SpO2: 99%   Weight: 65.6 kg (144 lb 10 oz)   Height: 5' 4" (1.626 m)      Body mass index is 24.82 kg/m².     Physical Exam  Vitals reviewed. " "  Constitutional:       Appearance: Normal appearance.   HENT:      Head: Normocephalic.      Mouth/Throat:      Mouth: Mucous membranes are moist.      Pharynx: Oropharynx is clear.   Eyes:      Conjunctiva/sclera: Conjunctivae normal.      Pupils: Pupils are equal, round, and reactive to light.   Cardiovascular:      Rate and Rhythm: Normal rate and regular rhythm.   Pulmonary:      Effort: Pulmonary effort is normal.      Breath sounds: Normal breath sounds.   Abdominal:      General: Bowel sounds are normal.      Palpations: Abdomen is soft.      Tenderness: There is no guarding or rebound.          Comments: Mild TTP to deep palp only to area. Otherwise soft and nontender.    Skin:     General: Skin is warm and dry.   Neurological:      General: No focal deficit present.      Mental Status: She is alert.   Psychiatric:         Mood and Affect: Mood normal.         Behavior: Behavior normal.         LABS     No results found for: "LABA1C", "HGBA1C"  CMP  Sodium   Date Value Ref Range Status   03/22/2022 140 136 - 145 mmol/L Final     Potassium   Date Value Ref Range Status   03/22/2022 3.6 3.5 - 5.1 mmol/L Final     Chloride   Date Value Ref Range Status   03/22/2022 106 95 - 110 mmol/L Final     CO2   Date Value Ref Range Status   03/22/2022 23 23 - 29 mmol/L Final     Glucose   Date Value Ref Range Status   03/22/2022 110 70 - 110 mg/dL Final     BUN   Date Value Ref Range Status   03/22/2022 10 6 - 20 mg/dL Final     Creatinine   Date Value Ref Range Status   03/22/2022 0.8 0.5 - 1.4 mg/dL Final     Calcium   Date Value Ref Range Status   03/22/2022 9.8 8.7 - 10.5 mg/dL Final     Total Protein   Date Value Ref Range Status   03/22/2022 7.4 6.0 - 8.4 g/dL Final     Albumin   Date Value Ref Range Status   03/22/2022 4.3 3.5 - 5.2 g/dL Final     Total Bilirubin   Date Value Ref Range Status   03/22/2022 0.5 0.1 - 1.0 mg/dL Final     Comment:     For infants and newborns, interpretation of results should be " "based  on gestational age, weight and in agreement with clinical  observations.    Premature Infant recommended reference ranges:  Up to 24 hours.............<8.0 mg/dL  Up to 48 hours............<12.0 mg/dL  3-5 days..................<15.0 mg/dL  6-29 days.................<15.0 mg/dL       Alkaline Phosphatase   Date Value Ref Range Status   03/22/2022 85 55 - 135 U/L Final     AST   Date Value Ref Range Status   03/22/2022 20 10 - 40 U/L Final     ALT   Date Value Ref Range Status   03/22/2022 13 10 - 44 U/L Final     Anion Gap   Date Value Ref Range Status   03/22/2022 11 8 - 16 mmol/L Final     eGFR if    Date Value Ref Range Status   03/22/2022 >60.0 >60 mL/min/1.73 m^2 Final     eGFR if non    Date Value Ref Range Status   03/22/2022 >60.0 >60 mL/min/1.73 m^2 Final     Comment:     Calculation used to obtain the estimated glomerular filtration  rate (eGFR) is the CKD-EPI equation.        Lab Results   Component Value Date    WBC 9.10 03/22/2022    HGB 13.7 03/22/2022    HCT 41.6 03/22/2022    MCV 94 03/22/2022     03/22/2022     No results found for: "CHOL"  No results found for: "HDL"  No results found for: "LDLCALC"  No results found for: "TRIG"  No results found for: "CHOLHDL"  Lab Results   Component Value Date    TSH 1.593 03/22/2022       ASSESSMENT     1. Lower abdominal pain    2. Pelvic pain    3. Pyuria           PLAN  Possibly GI related given unrevealing US.   Given duration- will check labs, urine. Trial bentyl prn. Plan to obtain CT once labs return.   If any new associated symptoms arise she will let me know.     1. Lower abdominal pain  - dicyclomine (BENTYL) 20 mg tablet; Take 1 tablet (20 mg total) by mouth 3 (three) times daily as needed.  Dispense: 40 tablet; Refill: 0  - CBC Auto Differential; Future  - Comprehensive Metabolic Panel; Future  - POCT urine dipstick without microscope  - POCT urine pregnancy    2. Pelvic pain  - dicyclomine (BENTYL) 20 " mg tablet; Take 1 tablet (20 mg total) by mouth 3 (three) times daily as needed.  Dispense: 40 tablet; Refill: 0  - CBC Auto Differential; Future  - Comprehensive Metabolic Panel; Future  - POCT urine dipstick without microscope  - POCT urine pregnancy  - Urine Culture High Risk    3. Pyuria  - Urine Culture High Risk       Follow up with PCP     Patient was counseled on when to seek emergent care. Patient's plan/treatment was discussed including medications and possible side effects. Verbalized understanding of all instructions.     This note was partly generated with [email or web address] voice recognition software. I apologize for any possible typographical errors.          JOSE LUIS Jenkins  Department of Internal Medicine - [facility]  04/24/2025          [1] There is no problem list on file for this patient.   [2]   Current Outpatient Medications:     cetirizine 10 mg chewable tablet, Take 10 mg by mouth., Disp: , Rfl:     dicyclomine (BENTYL) 20 mg tablet, Take 1 tablet (20 mg total) by mouth 3 (three) times daily as needed., Disp: 40 tablet, Rfl: 0

## 2025-04-26 LAB — BACTERIA UR CULT: NORMAL

## 2025-05-13 ENCOUNTER — PATIENT MESSAGE (OUTPATIENT)
Dept: INTERNAL MEDICINE | Facility: CLINIC | Age: 30
End: 2025-05-13
Payer: COMMERCIAL

## 2025-05-13 NOTE — TELEPHONE ENCOUNTER
LOV with Colleen Aldana NP , 4/24/2025  Your ordered a CT abd/pelvis on 4/248=/25 for RLQ abd pain. Pt states her out of pocket cost is $1400.00. Asking if there is any other testing that could possibly be done for her symptoms    NOTE: pt has pelvis u/s on 4/1/25 ordered by ob/gyn

## 2025-06-25 ENCOUNTER — PATIENT MESSAGE (OUTPATIENT)
Dept: INTERNAL MEDICINE | Facility: CLINIC | Age: 30
End: 2025-06-25
Payer: COMMERCIAL

## 2025-06-25 DIAGNOSIS — R10.31 RIGHT LOWER QUADRANT PAIN: Primary | ICD-10-CM

## 2025-06-25 DIAGNOSIS — R10.2 PELVIC PAIN: ICD-10-CM

## 2025-06-25 DIAGNOSIS — R10.30 LOWER ABDOMINAL PAIN: ICD-10-CM

## 2025-06-26 NOTE — TELEPHONE ENCOUNTER
Pt responding to 5/13 pt message; provider recommended GI referral before performing CT if pt preferred    Pt would like to proceed with GI referral; pended in encounter for review

## 2025-07-02 ENCOUNTER — LAB VISIT (OUTPATIENT)
Dept: LAB | Facility: HOSPITAL | Age: 30
End: 2025-07-02
Payer: COMMERCIAL

## 2025-07-02 ENCOUNTER — OFFICE VISIT (OUTPATIENT)
Dept: GASTROENTEROLOGY | Facility: CLINIC | Age: 30
End: 2025-07-02
Payer: COMMERCIAL

## 2025-07-02 VITALS
HEIGHT: 64 IN | WEIGHT: 144.38 LBS | BODY MASS INDEX: 24.65 KG/M2 | HEART RATE: 70 BPM | SYSTOLIC BLOOD PRESSURE: 109 MMHG | DIASTOLIC BLOOD PRESSURE: 75 MMHG

## 2025-07-02 DIAGNOSIS — R14.0 BLOATING: ICD-10-CM

## 2025-07-02 DIAGNOSIS — R10.31 RIGHT LOWER QUADRANT PAIN: Primary | ICD-10-CM

## 2025-07-02 DIAGNOSIS — R10.30 LOWER ABDOMINAL PAIN: ICD-10-CM

## 2025-07-02 DIAGNOSIS — R19.8 ALTERNATING CONSTIPATION AND DIARRHEA: ICD-10-CM

## 2025-07-02 DIAGNOSIS — R10.2 PELVIC PAIN: ICD-10-CM

## 2025-07-02 PROCEDURE — 1159F MED LIST DOCD IN RCRD: CPT | Mod: CPTII,S$GLB,,

## 2025-07-02 PROCEDURE — 3078F DIAST BP <80 MM HG: CPT | Mod: CPTII,S$GLB,,

## 2025-07-02 PROCEDURE — 86364 TISS TRNSGLTMNASE EA IG CLAS: CPT

## 2025-07-02 PROCEDURE — 36415 COLL VENOUS BLD VENIPUNCTURE: CPT

## 2025-07-02 PROCEDURE — 1160F RVW MEDS BY RX/DR IN RCRD: CPT | Mod: CPTII,S$GLB,,

## 2025-07-02 PROCEDURE — 99999 PR PBB SHADOW E&M-EST. PATIENT-LVL IV: CPT | Mod: PBBFAC,,,

## 2025-07-02 PROCEDURE — 99204 OFFICE O/P NEW MOD 45 MIN: CPT | Mod: S$GLB,,,

## 2025-07-02 PROCEDURE — 3074F SYST BP LT 130 MM HG: CPT | Mod: CPTII,S$GLB,,

## 2025-07-02 PROCEDURE — 3008F BODY MASS INDEX DOCD: CPT | Mod: CPTII,S$GLB,,

## 2025-07-02 NOTE — PROGRESS NOTES
"                     Gastroenterology Clinic Consultation Note    Patient ID: Lexis Candelario is a 29 y.o. female.        Chief Complaint: Abdominal Pain, Diarrhea (/), and Constipation (/)    CHIEF COMPLAINT:  Patient presents today for abdominal pain and GI symptoms.    HISTORY OF PRESENT ILLNESS:  Referred by PCP for right lower quadrant and pelvic pain. She reports ongoing right-sided abdominal pain that alternates between cramping and dull pain, with occasional sharp and twisting sensations. Pain intensity varies weekly, occasionally becoming more generalized across the entire abdomen. Pain improves after bowel movements. She initially suspected pain might be related to her menstrual cycle. Previous workup includes a normal GYN ultrasound in April ruling out ovarian cysts and normal laboratory studies by her PCP. Dicyclomine was prescribed for abdominal cramping but has not been initiated. She expresses interest in exploring alternative diagnostic approaches due to concerns about CT costs. She had a stomach virus in January with vomiting lasting 3 days, which resolved without complications.    GASTROINTESTINAL:  She reports chronic alternating bowel patterns with constipation and diarrhea. Stools are consistently floating and mucusy with a feeling of incomplete evacuation. Last bowel movement was yesterday, described as soft but not liquid. She experiences frequent post-digestive stomach gurgling, increased gas, and bloating. She reports occasional nausea related to her menstrual cycle and triggered by certain smells. She denies vomiting, reflux, difficulty swallowing, and painful swallowing. She denies unintentional weight loss.    CURRENT TREATMENTS:  She drinks electrolyte beverages containing magnesium for constipation management, reporting relief within a day. She has completely abstained from alcohol due to severe reactions, describing being "incapacitated for two days" after minimal social " "drinking.    ROS:  Review of Systems   Constitutional:  Negative for chills and fever.   Respiratory:  Negative for cough and shortness of breath.    Cardiovascular:  Negative for chest pain.   Gastrointestinal:  Positive for abdominal pain, constipation, diarrhea and nausea. Negative for blood in stool, heartburn, melena and vomiting.        +Mucus in stool  +Floating stools   Skin:  Negative for rash.   Neurological:  Negative for seizures, loss of consciousness and weakness.        Medical History:  has a past medical history of Sinusitis.    Surgical History:  has a past surgical history that includes [place] tooth extraction.    Family History: family history includes Breast cancer in her maternal aunt; No Known Problems in her brother, father, mother, and sister..       Review of patient's allergies indicates:   Allergen Reactions    Diphenhydramine Hives and Other (See Comments)     Had  Intravenously    Benadryl allergy decongestant Hives       Medications Ordered Prior to Encounter[1]    Labs:  Lab Results   Component Value Date    WBC 7.11 04/24/2025    HGB 13.8 04/24/2025    HCT 42.5 04/24/2025     04/24/2025    ALKPHOS 75 04/24/2025    ALT 10 04/24/2025    AST 15 04/24/2025     04/24/2025    K 3.8 04/24/2025     04/24/2025    CREATININE 0.8 04/24/2025    BUN 9 04/24/2025    CO2 24 04/24/2025    TSH 1.593 03/22/2022       Vital Signs:  /75   Pulse 70   Ht 5' 4" (1.626 m)   Wt 65.5 kg (144 lb 6.4 oz)   BMI 24.79 kg/m²   Body mass index is 24.79 kg/m².    Physical Exam:  Physical Exam  Vitals and nursing note reviewed.   Constitutional:       General: She is not in acute distress.     Appearance: Normal appearance. She is not ill-appearing.   HENT:      Head: Normocephalic and atraumatic.   Eyes:      Extraocular Movements: Extraocular movements intact.   Cardiovascular:      Rate and Rhythm: Normal rate and regular rhythm.   Pulmonary:      Effort: Pulmonary effort is normal. No " respiratory distress.   Abdominal:      General: Abdomen is flat. Bowel sounds are normal. There is no distension.      Palpations: Abdomen is soft.      Tenderness: There is no abdominal tenderness.   Neurological:      General: No focal deficit present.      Mental Status: She is alert and oriented to person, place, and time.   Psychiatric:         Mood and Affect: Mood normal.         Behavior: Behavior normal.         Imaging reviewed: US Pelvis 04/01/202  FINDINGS:  Uterus:     Size: 8.8 x 4.7 x 3.6 cm     Masses: None     Endometrium: Normal in this pre menopausal patient, measuring 6.8 mm.     Right ovary:     Size: 4.0 x 3.3 x 2.6 cm     Appearance: Normal appearance with simple and hemorrhagic follicles.     Vascular flow: 4.0 x 3.3 x 2.6     Left ovary:     Size: 3.3 x 2.5 x 2.3 cm     Appearance: Normal     Vascular Flow: Normal.     Free Fluid:     Small amount of free fluid in the pelvis is likely physiologic.     Impression:     Unremarkable examination for a patient of this age.        Electronically signed by:Omid Doyle  Date:                                            04/02/2025  Time:                                           08:54    Endoscopy reviewed: no previous endsocpies    Assessment & Plan  1. Right lower quadrant pain    2. Alternating constipation and diarrhea    3. Bloating    4. Pelvic pain    5. Lower abdominal pain      Orders Placed This Encounter    Clostridium difficile EIA    Stool culture    X-Ray KUB    H. pylori antigen, stool    Pancreatic elastase, fecal    Fecal fat, qualitative    Calprotectin, Stool    Giardia / Cryptosporidum, EIA    Stool Exam-Ova,Cysts,Parasites    Celiac Disease Panel     ALTERNATING CONSTIPATION AND DIARRHEA:  - Considered post-infectious IBS as potential cause of symptoms given recent stomach virus in January.  - Explained potential connection between stress and gut symptoms (gut-brain interaction).  - Started Bentyl (dicyclomine) 20 mg as needed for  abdominal cramping, up to 3 times daily, with caution due to potential constipation side effect.  - Recommend OTC IBGard (concentrated peppermint oil) for bloating, diarrhea, and constipation.   Evaluated for malabsorption and inflammation in colon due to floating, mucusy stools.  - Ordered stool studies to rule out malabsorption, inflammation, and H. pylori.  - Opted for abdominal XR to assess for constipation before pursuing more expensive CT Abdomen.  - Ordered abdominal XR to assess for constipation.  - If constipation is confirmed: Recommend increasing fiber and water intake, start OTC Metamucil, Citrucel, or psyllium husk fiber supplement; start OTC MiraLAX; use magnesium citrate for bowel cleanout.  - Discussed how constipation can cause mucus production in stool.    RIGHT LOWER QUADRANT PAIN:  - Assessed for constipation as possible source of right-sided abdominal pain.  - Reviewed unremarkable pelvic ultrasound    BLOATING:  - Consider H. pylori as cause of bloating  - Ordered stool studies to rule out malabsorption, inflammation, and H. pylori.  - Investigated possible celiac disease or gluten intolerance given symptoms and family history.  - Informed about need for upper endoscopy with biopsies if celiac panel is positive.  - Ordered blood test for celiac panel.    FOLLOW-UP:  - Follow up after completing XR and stool studies, and receiving celiac panel results.  - Contact office through patient portal with any questions.            ADELIA BEGUM-KAM  Gastroenterology Department  [facility]  [phone]     This note was generated with the assistance of ambient listening technology. Verbal consent was obtained by the patient and accompanying visitor(s) for the recording of patient appointment to facilitate this note. I attest to having reviewed and edited the generated note for accuracy, though some syntax or spelling errors may persist. Please contact the author of this  note for any clarification.          [1]   Current Outpatient Medications on File Prior to Visit   Medication Sig Dispense Refill    cetirizine 10 mg chewable tablet Take 10 mg by mouth.       No current facility-administered medications on file prior to visit.

## 2025-07-12 ENCOUNTER — HOSPITAL ENCOUNTER (OUTPATIENT)
Dept: RADIOLOGY | Facility: OTHER | Age: 30
Discharge: HOME | End: 2025-07-12
Payer: COMMERCIAL

## 2025-07-12 DIAGNOSIS — R19.8 ALTERNATING CONSTIPATION AND DIARRHEA: ICD-10-CM

## 2025-07-12 DIAGNOSIS — R10.31 RIGHT LOWER QUADRANT PAIN: ICD-10-CM

## 2025-07-12 PROCEDURE — 74018 RADEX ABDOMEN 1 VIEW: CPT | Mod: 26,,, | Performed by: RADIOLOGY

## 2025-07-12 PROCEDURE — 74018 RADEX ABDOMEN 1 VIEW: CPT | Mod: TC,FY

## 2025-07-14 ENCOUNTER — RESULTS FOLLOW-UP (OUTPATIENT)
Dept: GASTROENTEROLOGY | Facility: CLINIC | Age: 30
End: 2025-07-14
Payer: COMMERCIAL

## 2025-08-05 ENCOUNTER — LAB VISIT (OUTPATIENT)
Dept: LAB | Facility: HOSPITAL | Age: 30
End: 2025-08-05
Payer: COMMERCIAL

## 2025-08-05 DIAGNOSIS — R14.0 BLOATING: ICD-10-CM

## 2025-08-05 DIAGNOSIS — R19.8 ALTERNATING CONSTIPATION AND DIARRHEA: ICD-10-CM

## 2025-08-05 PROCEDURE — 86258 DGP ANTIBODY EACH IG CLASS: CPT

## 2025-08-05 PROCEDURE — 36415 COLL VENOUS BLD VENIPUNCTURE: CPT | Mod: PO

## 2025-08-07 ENCOUNTER — RESULTS FOLLOW-UP (OUTPATIENT)
Dept: GASTROENTEROLOGY | Facility: CLINIC | Age: 30
End: 2025-08-07
Payer: COMMERCIAL

## 2025-08-07 LAB
W GLIADIN AB IGA, DEAMIDATED: 1.2 U/ML
W GLIADIN AB IGG, DEAMIDATED: <0.6 U/ML
W IMMUNOGLOBULIN A (IGA): 187 MG/DL
W TISSUE TRANSGLUTAMINASE IGA AB: 0.4 U/ML
W TISSUE TRANSGLUTAMINASE IGG: <0.6 U/ML